# Patient Record
Sex: FEMALE | Race: WHITE | NOT HISPANIC OR LATINO | Employment: OTHER | URBAN - METROPOLITAN AREA
[De-identification: names, ages, dates, MRNs, and addresses within clinical notes are randomized per-mention and may not be internally consistent; named-entity substitution may affect disease eponyms.]

---

## 2018-11-02 ENCOUNTER — OCCUPATIONAL MEDICINE (OUTPATIENT)
Dept: URGENT CARE | Facility: CLINIC | Age: 53
End: 2018-11-02
Payer: COMMERCIAL

## 2018-11-02 ENCOUNTER — APPOINTMENT (OUTPATIENT)
Dept: RADIOLOGY | Facility: IMAGING CENTER | Age: 53
End: 2018-11-02
Attending: NURSE PRACTITIONER
Payer: COMMERCIAL

## 2018-11-02 VITALS
HEART RATE: 92 BPM | OXYGEN SATURATION: 94 % | SYSTOLIC BLOOD PRESSURE: 118 MMHG | RESPIRATION RATE: 16 BRPM | TEMPERATURE: 98.6 F | DIASTOLIC BLOOD PRESSURE: 74 MMHG | BODY MASS INDEX: 30.64 KG/M2 | WEIGHT: 152 LBS | HEIGHT: 59 IN

## 2018-11-02 DIAGNOSIS — S89.92XA INJURY OF LEFT KNEE, INITIAL ENCOUNTER: ICD-10-CM
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
## 2018-11-02 DIAGNOSIS — S86.912A KNEE STRAIN, LEFT, INITIAL ENCOUNTER: ICD-10-CM

## 2018-11-02 DIAGNOSIS — S46.911A STRAIN OF RIGHT SHOULDER, INITIAL ENCOUNTER: ICD-10-CM

## 2018-11-02 DIAGNOSIS — M62.838 TRAPEZIUS MUSCLE SPASM: ICD-10-CM

## 2018-11-02 PROCEDURE — 73562 X-RAY EXAM OF KNEE 3: CPT | Mod: 26,LT,29 | Performed by: NURSE PRACTITIONER

## 2018-11-02 PROCEDURE — 99204 OFFICE O/P NEW MOD 45 MIN: CPT | Mod: 29 | Performed by: NURSE PRACTITIONER

## 2018-11-02 RX ORDER — CYCLOBENZAPRINE HCL 10 MG
10 TABLET ORAL 3 TIMES DAILY PRN
Qty: 30 TAB | Refills: 0 | Status: SHIPPED | OUTPATIENT
Start: 2018-11-02 | End: 2019-10-21

## 2018-11-02 RX ORDER — TRAZODONE HYDROCHLORIDE 50 MG/1
50 TABLET ORAL NIGHTLY
COMMUNITY
End: 2021-11-12

## 2018-11-02 ASSESSMENT — ENCOUNTER SYMPTOMS
NECK PAIN: 1
FALLS: 1

## 2018-11-02 NOTE — PROGRESS NOTES
"Subjective:      Bernarda Raymond is a 53 y.o. female who presents with Knee Injury (patient fell down a stair well and twisted left knee, hurts to walk, bend and move it) and Neck Pain (raphaeletn states her neck and shoulder hurt due to fall )      DOI: 11/2/18- Pt reports the elevators at work today were broken.  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 Pt reports she slipped and fell down approximately 4 stairs, between the 3rd and 2nd floor, and landed on the landing of 2nd floor. She reports to stop her fall she grabbed onto the railing with her right arm as she slid down the stairs. Reports her left leg was bent underneath her as she fell. She is currently reporting right upper arm/shoulder pain and left knee pain. She has not taken any medication for this injury. Previous left knee in [place] in 2000. She denies a second job.      HPI    Review of Systems   Musculoskeletal: Positive for falls, joint pain (left knee) and neck pain (right shoulder).   All other systems reviewed and are negative.    History reviewed. No pertinent past medical history. History reviewed. No pertinent surgical history.   Social History     Social History   • Marital status:      Spouse name: N/A   • Number of children: N/A   • Years of education: N/A     Occupational History   • Not on file.     Social History Main Topics   • Smoking status: Never Smoker   • Smokeless tobacco: Never Used   • Alcohol use No   • Drug use: No   • Sexual activity: No     Other Topics Concern   • Not on file     Social History Narrative   • No narrative on file          Objective:     /74   Pulse 92   Temp 37 °C (98.6 °F)   Resp 16   Ht 1.499 m (4' 11\")   Wt 68.9 kg (152 lb)   SpO2 94%   BMI 30.70 kg/m²      Physical Exam   Constitutional: She is oriented to person, place, and time. Vital signs are normal. She appears well-developed and well-nourished.   HENT:   Head: " Normocephalic and atraumatic.   Eyes: Pupils are equal, round, and reactive to light. EOM are normal.   Neck: Normal range of motion.   Cardiovascular: Normal rate and regular rhythm.    Pulmonary/Chest: Effort normal.   Musculoskeletal:        Left knee: She exhibits decreased range of motion and swelling. She exhibits no effusion, no ecchymosis, no deformity, no LCL laxity, normal patellar mobility, no bony tenderness and no MCL laxity. Tenderness found. Medial joint line and lateral joint line tenderness noted.        Cervical back: She exhibits tenderness, pain and spasm.        Back:    Neurological: She is alert and oriented to person, place, and time.   Skin: Skin is warm and dry. Capillary refill takes less than 2 seconds.   Psychiatric: She has a normal mood and affect. Her speech is normal and behavior is normal. Thought content normal.   Vitals reviewed.      Left knee- generally tender to left knee. Exquisitely tender to medial and lateral joint lines. Moderate STS. No effusion present. No crepitus noted with palp. Limited ROM secondary to pain. No laxity noted to knee. Antalgic gait.     Xray: no fracture or dislocation by my read. Radiology review pending.  11/2/2018 11:54 AM    HISTORY/REASON FOR EXAM:  Pain/Deformity Following Trauma.      TECHNIQUE/EXAM DESCRIPTION AND NUMBER OF VIEWS:  3 views of the LEFT knee.    COMPARISON: None    FINDINGS:  Deformities the proximal left tibia and fibula appear to be old.  No acute fracture identified.  No definite knee joint effusion.  Tiny marginal spurs.      Impression       Deformities of the proximal left tibia and fibula appear to be old. No definite acute fracture.       Assessment/Plan:     1. Fall, initial encounter    2. Injury of left knee, initial encounter  - DX-KNEE 3 VIEWS LEFT; Future    3. Knee strain, left, initial encounter    4. Trapezius muscle spasm  - cyclobenzaprine (FLEXERIL) 10 MG Tab; Take 1 Tab by mouth 3 times a day as needed.   Dispense: 30 Tab; Refill: 0    5. Strain of right shoulder, initial encounter    Hinged knee brace  Ice and elevate regularly for the next 48 hours  OTC Ibuprofen as needed for pain  Sedating effects of flexeril discussed  FV in 4 days

## 2018-11-02 NOTE — LETTER
[address] [place] [address]  Phone:  [phone] - Fax:  [phone]   [facility] Progress Report and Disability Certification  Date of Service: 11/2/2018   No Show:  No  Date / Time of Next Visit: 11/6/2018 @ 9:15am   Claim Information   Patient Name: Bernarda Raymond  Claim Number:     Employer:    [employer]  Date of Injury: 11/2/2018     Insurer / TPA: [employer]  ID / SSN:     Occupation:   Diagnosis: Diagnoses of Fall, initial encounter, Injury of left knee, initial encounter, Knee strain, left, initial encounter, Trapezius muscle spasm, and Strain of right shoulder, initial encounter were pertinent to this visit.    Medical Information   Related to Industrial Injury? Yes    Subjective Complaints:  DOI: 11/2/18- Pt reports the elevators at work today were broken.  escorted pt and other employees down stairwell in order to access [employer] elevators. Pt reports she slipped and fell down approximately 4 stairs, between the 3rd and 2nd floor, and landed on the landing of 2nd floor. She reports to stop her fall she grabbed onto the railing with her right arm as she slid down the stairs. Reports her left leg was bent underneath her as she fell. She is currently reporting right upper arm/shoulder pain and left knee pain. She has not taken any medication for this injury. Previous left knee in [employer] in 2000. She denies a second job.    Objective Findings: Left knee- generally tender to left knee. Exquisitely tender to medial and lateral joint lines. Moderate STS. No effusion present. No crepitus noted with palp. Limited ROM secondary to pain. No laxity noted to knee. Antalgic gait.   Pre-Existing Condition(s): Per pt left ACL tear in 2000. Never had MRI performed. Apparently healed it with PT. No further issues since approx. 2002 with left knee   Assessment:   Initial  Visit    Status: Additional Care Required  Permanent Disability:No    Plan:      Diagnostics: X-ray  Comments:negative views of left knee    Comments:  Hinged knee brace  Ice and elevate regularly for the next 48 hours  OTC Ibuprofen as needed for pain  Sedating effects of flexeril discussed  FV in 4 days    Disability Information   Status: Released to Restricted Duty    From:  11/2/2018  Through: 11/6/2018 Restrictions are: Temporary   Physical Restrictions   Sitting:    Standing:  < or = to 1 hr/day Stooping:    Bending:      Squatting:    Walking:    Comments:for a total of 30 minutes over the course of the work day Climbing:    Pushing:      Pulling:    Other:    Reaching Above Shoulder (L):   Reaching Above Shoulder (R):       Reaching Below Shoulder (L):    Reaching Below Shoulder (R):      Not to exceed Weight Limits   Carrying(hrs):   Weight Limit(lb):   Lifting(hrs):   Weight  Limit(lb):     Comments:      Repetitive Actions   Hands: i.e. Fine Manipulations from Grasping:     Feet: i.e. Operating Foot Controls:     Driving / Operate Machinery:     Physician Name: MATTHEW Stover Physician Signature: ALEX Bowling e-Signature: Dr. Mao Christiansen, Medical Director   Clinic Name / Location: [address] [address] Clinic Phone Number: Dept: [phone]   Appointment Time: 10:30 Am Visit Start Time: 11:15 AM   Check-In Time:  11:00 Am Visit Discharge Time:  1:05pm   Original-Treating Physician or Chiropractor    Page 2-Insurer/TPA    Page 3-Employer    Page 4-Employee

## 2018-11-02 NOTE — LETTER
"EMPLOYEE’S CLAIM FOR COMPENSATION/ REPORT OF INITIAL TREATMENT  FORM C-4    EMPLOYEE’S CLAIM - PROVIDE ALL INFORMATION REQUESTED   First Name  Bernarda Rothman Last Name  Segundo Birthdate                    1965                Sex  female Claim Number   Home Address  [address] Age  53 y.o. Height  1.499 m (4' 11\") Weight  68.9 kg (152 lb) [facility]     [place] Zip  [address] Telephone  [phone] (home)    Mailing Address  [address] [place] Zip  [address] Primary Language Spoken  English    Insurer  unknown Third Party   [employer] [facility]   Employee's Occupation (Job Title) When Injury or Occupational Disease Occurred      Employer's Name     [employer]  Telephone      Employer Address  [address]  [place]  [address]    Date of Injury  11/2/2018               Hour of Injury  7:55 AM Date Employer Notified  11/2/2018 Last Day of Work after Injury or Occupational Disease  11/2/2018 Supervisor to Whom Injury Reported  Joselin Shine   [address] or [address] of Acc[address] (if applicable)  [address]   What were you doing at the time of accident? (if applicable)  Walking down stairs    How did this injury or occupational disease occur? (Be specific an answer in detail. Use additional sheet if necessary)  Elevators were not working. Maintenance escorted us(3ppl) downstaris from 7th [place] parking garage to 2nd floor, fell between 3rd & 2nd (?) Floor on stairs-then walked carefully with pain across 2nd floor to fright elev. from there, went to 15th floor to office.   If you believe that you have an occupational disease, when did you first have knowledge of the disability and it relationship to your employment?  n/a Witnesses to the Accident  [facility]  [phone]      "   Nature of Injury or Occupational Disease  Crushing  Part(s) of Body Injured or Affected  Knee (L), Defer, Defer    I certify that the above is true and correct to the best of my knowledge and that I have provided this information in order to obtain the benefits of [place] or [place]).  I hereby authorize any physician, chiropractor, surgeon, practitioner, or other person, any hospital, including [facility] or [facility], any medical service organization, any insurance company, or other institution or organization to release to each other, any medical or other information, including benefits paid or payable, pertinent to this injury or disease, except information relative to diagnosis, treatment and/or counseling for AIDS, psychological conditions, alcohol or controlled substances, for which I must give specific authorization.  A Photostat of this authorization shall be as valid as the original.     Date   Place   Employee’s Signature   THIS REPORT MUST BE COMPLETED AND MAILED WITHIN 3 WORKING DAYS OF TREATMENT   Place  [facility]  Name of [facility]   Date  11/2/2018 Diagnosis  (W19.XXXA) Fall, initial encounter  (S89.92XA) Injury of left knee, initial encounter  (S86.912A) Knee strain, left, initial encounter  (M62.838) Trapezius muscle spasm  (S46.911A) Strain of right shoulder, initial encounter Is there evidence the injured employee was under the influence of alcohol and/or another controlled substance at the time of accident?   Hour  11:15 AM Description of Injury or Disease  Diagnoses of Fall, initial encounter, Injury of left knee, initial encounter, Knee strain, left, initial encounter, Trapezius muscle spasm, and Strain of right shoulder, initial encounter were pertinent to this visit. No   Treatment  Hinged knee brace  Ice and elevate regularly for the next 48 hours  OTC  Ibuprofen as needed for pain  Sedating effects of flexeril discussed  FV in 4 days  Have you advised the patient to remain off work five days or more? No   X-Ray Findings  Negative  Comments:Negative views of left knee   If Yes   From Date  To Date      From information given by the employee, together with medical evidence, can you directly connect this injury or occupational disease as job incurred?  Yes If No Full Duty  No Modified Duty  Yes   Is additional medical care by a physician indicated?  Yes If Modified Duty, Specify any Limitations / Restrictions      Do you know of any previous injury or disease contributing to this condition or occupational disease?                            Yes  Comments:reports she tore her left ACL in 2000. Never had MRI performed. Apparently healed it with PT. No further issues since approx. 2002 with left knee   Date  11/2/2018 Print Doctor’s Name MATTHEW Stover I certify the employer’s copy of  this form was mailed on:   Address  [address] [place] [address] Insurer’s Use Only     [facility]  [address]    Provider’s Tax ID Number  [id number] Telephone  Dept: [phone]        e-SignWHALEX CHAMPION   e-Signature: Dr. Mao Christiansen, Medical Director Degree  APRN        ORIGINAL-TREATING PHYSICIAN OR CHIROPRACTOR    PAGE 2-INSURER/TPA    PAGE 3-EMPLOYER    PAGE 4-EMPLOYEE             Form C-4 (rev10/07)              BRIEF DESCRIPTION OF RIGHTS AND BENEFITS  (Pursuant to [place] 616C.050)    Notice of Injury or Occupational Disease (Incident Report Form C-1): If an injury or occupational disease (OD) arises out of and in the  course of employment, you must provide written notice to your employer as soon as practicable, but no later than 7 days after the accident or  OD. Your employer shall maintain a sufficient supply of the required forms.    Claim for Compensation (Form C-4): If medical treatment is sought, the form C-4 is available at the place of  "initial treatment. A completed  \"Claim for Compensation\" (Form C-4) must be filed within 90 days after an accident or OD. The treating physician or chiropractor must,  within 3 working days after treatment, complete and mail to the employer, the employer's insurer and third-party , the Claim for  Compensation.    Medical Treatment: If you require medical treatment for your on-the-job injury or OD, you may be required to select a physician or  chiropractor from a list provided by your workers’ compensation insurer, if it has contracted with an Organization for Managed Care (MCO) or  Preferred Provider Organization (PPO) or providers of health care. If your employer has not entered into a contract with an MCO or PPO, you  may select a physician or chiropractor from the Panel of Physicians and Chiropractors. Any medical costs related to your industrial injury or  OD will be paid by your insurer.    Temporary Total Disability (TTD): If your doctor has certified that you are unable to work for a period of at least 5 consecutive days, or 5  cumulative days in a 20-day period, or places restrictions on you that your employer does not accommodate, you may be entitled to TTD  compensation.    Temporary Partial Disability (TPD): If the wage you receive upon reemployment is less than the compensation for TTD to which you are  entitled, the insurer may be required to pay you TPD compensation to make up the difference. TPD can only be paid for a maximum of 24  months.    Permanent Partial Disability (PPD): When your medical condition is stable and there is an indication of a PPD as a result of your injury or  OD, within 30 days, your insurer must arrange for an evaluation by a rating physician or chiropractor to determine the degree of your PPD. The  amount of your PPD award depends on the date of injury, the results of the PPD evaluation and your age and wage.    Permanent Total Disability (PTD): If you are " medically certified by a treating physician or chiropractor as permanently and totally disabled  and have been granted a PTD status by your insurer, you are entitled to receive monthly benefits not to exceed 66 2/3% of your average  monthly wage. The amount of your PTD payments is subject to reduction if you previously received a PPD award.    Vocational Rehabilitation Services: You may be eligible for vocational rehabilitation services if you are unable to return to the job due to a  permanent physical impairment or permanent restrictions as a result of your injury or occupational disease.    Transportation and Per Juan Carlos Reimbursement: You may be eligible for travel expenses and per juan carlos associated with medical treatment.    Reopening: You may be able to reopen your claim if your condition worsens after claim closure.    Appeal Process: If you disagree with a written determination issued by the insurer or the insurer does not respond to your request, you may  appeal to the Department of Administration, , by following the instructions contained in your determination letter. You must  appeal the determination within 70 days from the date of the determination letter at [address], [address], [place]  [address], or [address], [address], [place] [address]. If you disagree with the  decision, you may appeal to the  Department of Administration, . You must file your appeal within 30 days from the date of the  decision  letter at [address] [place], [address], [place], [place] [address], or [address], [address], [place] [address]. If you  disagree with a decision of an , you may file a petition for judicial review with the District Court. You must do so within 30  days of the Appeal Officer’s decision. You may be represented by an  at your own expense or you may contact the [facility] for  possible  representation.    [place]  for Injured Workers (NAIW): If you disagree with a  decision, you may request that NAIW represent you  without charge at an  Hearing. For information regarding denial of benefits, you may contact the NAIW at: [address], [address], [place] [address], [phone], or [address], [place] [address], [phone]    To File a Complaint with the Division: If you wish to file a complaint with the  of the Division of Industrial Relations (DIR),  please contact the Workers’ Compensation Section, [address], [address], [place], [place] [address], telephone (025) [phone], or  [address], [address], [place], [place] [address], telephone [phone]) [phone].    For assistance with Workers’ Compensation Issues: you may contact the Office of the Governor Consumer Health Assistance, [address], [address], [place], [place] [address], Toll Free 1-372.236.6171, Web site: http://govcha.[facility].nv.us, E-mail  Ene@[facility].nv.                                                                                                                                                                                                                                   __________________________________________________________________                                                                   _________________                Employee Name / Signature                                                                                                                                                       Date                                                                                                                                                                                                     D-2 (rev. 10/07)

## 2018-11-02 NOTE — LETTER
November 2, 2018         Patient: Bernarda Raymond   YOB: 1965   Date of Visit: 11/2/2018           To Whom it May Concern:    Bernarda Raymond was seen in my clinic on 11/2/2018. Please excuse her from work today.        Sincerely,           ELMER Stover.  Electronically Signed

## 2018-11-06 ENCOUNTER — OCCUPATIONAL MEDICINE (OUTPATIENT)
Dept: OCCUPATIONAL MEDICINE | Facility: CLINIC | Age: 53
End: 2018-11-06
Payer: COMMERCIAL

## 2018-11-06 VITALS
BODY MASS INDEX: 31.41 KG/M2 | SYSTOLIC BLOOD PRESSURE: 130 MMHG | DIASTOLIC BLOOD PRESSURE: 86 MMHG | WEIGHT: 160 LBS | OXYGEN SATURATION: 92 % | HEIGHT: 60 IN | RESPIRATION RATE: 16 BRPM | HEART RATE: 96 BPM | TEMPERATURE: 99 F

## 2018-11-06 DIAGNOSIS — S83.92XD SPRAIN OF LEFT KNEE, UNSPECIFIED LIGAMENT, SUBSEQUENT ENCOUNTER: ICD-10-CM

## 2018-11-06 PROCEDURE — 99203 OFFICE O/P NEW LOW 30 MIN: CPT | Performed by: PREVENTIVE MEDICINE

## 2018-11-06 ASSESSMENT — PAIN SCALES - GENERAL: PAINLEVEL: 6=MODERATE PAIN

## 2018-11-06 NOTE — LETTER
[address],   [address] [place] [address]  Phone:  [phone] - Fax:  [phone]   Occupational Health [facility] Progress Report and Disability Certification  Date of Service: 11/6/2018   No Show:  No  Date / Time of Next Visit: 11/16/2018 @ 9AM   Claim Information   Patient Name: Bernarda Raymond  Claim Number:     Employer:   [employer]  Date of Injury: 11/2/2018     Insurer / TPA: [employer]  ID / SSN:     Occupation:   Diagnosis: The encounter diagnosis was Sprain of left knee, unspecified ligament, subsequent encounter.    Medical Information   Related to Industrial Injury? Yes    Subjective Complaints:  Date of injury 11/2/2018.  Mechanism of injury-fall on stairs.  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  She was seen in urgent care 4 days ago where x-rays were negative.  She does report some interval improvement with less throbbing.  She had some swelling which is improved.  She is using OTC Advil with some relief.   Objective Findings: Appearance: Well-developed, well-nourished.   Mental Status: Mood and Affect normal. Pleasant. Cooperative. Appropriate.   ENT: Oropharynx clear. Moist mucous membranes. Hearing normal.   Eyes: Pupils reactive. Conjunctiva normal. No scleral icterus.   Neck: Trachea Midline. No thyromegaly. No masses.  Cardiovascular: Normal rate. Regular rhythm. Normal heart sounds.   Chest: Effort normal. Breath sounds clear.   Skin: Skin is warm and dry. No rash.   Musculoskeletal: Left knee shows no ecchymosis.  Minimal swelling.  Tenderness medially.  Pain with valgus stress testing.  Good flexion extension.  Distal neurovascular intact.     Pre-Existing Condition(s):     Assessment:   Condition Improved    Status: Additional Care Required  Permanent Disability:No    Plan:      Diagnostics:      Comments:       Disability Information   Status:  Released to Restricted Duty    From:  11/6/2018  Through: 11/16/2018 Restrictions are: Temporary   Physical Restrictions   Sitting:    Standing:  < or = to 4 hrs/day Stooping:    Bending:      Squatting:    Walking:  < or = to 2 hrs/day Climbing:    Pushing:      Pulling:    Other:    Reaching Above Shoulder (L):   Reaching Above Shoulder (R):       Reaching Below Shoulder (L):    Reaching Below Shoulder (R):      Not to exceed Weight Limits   Carrying(hrs):   Weight Limit(lb):   Lifting(hrs):   Weight  Limit(lb):     Comments:      Repetitive Actions   Hands: i.e. Fine Manipulations from Grasping:     Feet: i.e. Operating Foot Controls:     Driving / Operate Machinery:     Physician Name: Darin Mahoney M.D. Physician Signature: DARIN Charles M.D. e-Signature: Dr. Mao Christiansen, Medical Director   Clinic Name / Location: [address],   [address] [place] [address] Clinic Phone Number: Dept: [phone]   Appointment Time: 9:15 Am Visit Start Time: 9:13 AM   Check-In Time:  9:01 Am Visit Discharge Time:  9:41 AM   Original-Treating Physician or Chiropractor    Page 2-Insurer/TPA    Page 3-Employer    Page 4-Employee

## 2018-11-06 NOTE — PROGRESS NOTES
Subjective:      Bernarda Raymond is a 53 y.o. female who presents with Follow-Up (WC DOI (11/2/18)-Left knee- Feeling same- room 16 )      Date of injury 11/2/2018.  Mechanism of injury-fall on stairs.  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  She was seen in urgent care 4 days ago where x-rays were negative.  She does report some interval improvement with less throbbing.  She had some swelling which is improved.  She is using OTC Advil with some relief.     HPI    ROS  Comprehensive medical history form reviewed. Pertinent positives and negatives included in HPI.    PFSH: reviewed in Epic    PMH:  has no past medical history on file.  MEDS:   Current Outpatient Prescriptions:   •  traZODone (DESYREL) 50 MG Tab, Take 50 mg by mouth every evening., Disp: , Rfl:   •  cyclobenzaprine (FLEXERIL) 10 MG Tab, Take 1 Tab by mouth 3 times a day as needed., Disp: 30 Tab, Rfl: 0  ALLERGIES: No Known Allergies  SURGHX: History reviewed. No pertinent surgical history.  SOCHX:  reports that she has never smoked. She has never used smokeless tobacco. She reports that she does not drink alcohol or use drugs.  Work Status: Works as   FH: No pertinent hereditary disorders.        Objective:     /86 (BP Location: Right arm, Patient Position: Sitting, BP Cuff Size: Adult)   Pulse 96   Temp 37.2 °C (99 °F) (Temporal)   Resp 16   Ht 1.524 m (5')   Wt 72.6 kg (160 lb)   SpO2 92%   BMI 31.25 kg/m²      Physical Exam    Appearance: Well-developed, well-nourished.   Mental Status: Mood and Affect normal. Pleasant. Cooperative. Appropriate.   ENT: Oropharynx clear. Moist mucous membranes. Hearing normal.   Eyes: Pupils reactive. Conjunctiva normal. No scleral icterus.   Neck: Trachea Midline. No thyromegaly. No masses.  Cardiovascular: Normal rate. Regular rhythm. Normal heart sounds.   Chest: Effort normal. Breath sounds clear.   Skin: Skin is warm and dry. No rash.    Musculoskeletal: Left knee shows no ecchymosis.  Minimal swelling.  Tenderness medially.  Pain with valgus stress testing.  Good flexion extension.  Distal neurovascular intact.         Assessment/Plan:     1. Sprain of left knee, unspecified ligament, subsequent encounter  New to occupational health from urgent care  Satisfactory interval improvement  OTC Advil  Good activity  Recheck in 1 week

## 2018-11-16 ENCOUNTER — OCCUPATIONAL MEDICINE (OUTPATIENT)
Dept: OCCUPATIONAL MEDICINE | Facility: CLINIC | Age: 53
End: 2018-11-16
Payer: COMMERCIAL

## 2018-11-16 VITALS
BODY MASS INDEX: 31.41 KG/M2 | DIASTOLIC BLOOD PRESSURE: 80 MMHG | HEIGHT: 60 IN | HEART RATE: 112 BPM | SYSTOLIC BLOOD PRESSURE: 124 MMHG | WEIGHT: 160 LBS | RESPIRATION RATE: 16 BRPM | TEMPERATURE: 97.6 F | OXYGEN SATURATION: 91 %

## 2018-11-16 DIAGNOSIS — S83.92XD SPRAIN OF LEFT KNEE, UNSPECIFIED LIGAMENT, SUBSEQUENT ENCOUNTER: ICD-10-CM

## 2018-11-16 PROCEDURE — 99214 OFFICE O/P EST MOD 30 MIN: CPT | Performed by: PREVENTIVE MEDICINE

## 2018-11-16 ASSESSMENT — PAIN SCALES - GENERAL: PAINLEVEL: 6=MODERATE PAIN

## 2018-11-16 ASSESSMENT — ENCOUNTER SYMPTOMS
CONSTITUTIONAL NEGATIVE: 1
NEUROLOGICAL NEGATIVE: 1

## 2018-11-16 NOTE — LETTER
[address],   [address] [place] [address]  Phone:  [phone] - Fax:  [phone]   [facility] Progress Report and Disability Certification  Date of Service: 11/16/2018   No Show:  No  Date / Time of Next Visit: 12/20/2018 @ 9:35 AM   Claim Information   Patient Name: Bernarda Raymond  Claim Number:     Employer:   [employer] [address] [employer] Date of Injury: 11/2/2018     Insurer / TPA: [employer]  ID / SSN:     Occupation:   Diagnosis: The encounter diagnosis was Sprain of left knee, unspecified ligament, subsequent encounter.    Medical Information   Related to Industrial Injury? Yes    Subjective Complaints:  Date of injury 11/2/2018.  Mechanism of injury-fall on stairs.  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  She was seen in urgent care where x-rays were negative.  Although she relates some interval improvement, still continues to have moderate activity related pain on the medial aspect of the joint.  Today, she reports that she suffered an ACL tear 18 years ago which improved with conservative management.   Objective Findings: Appearance: Well-developed, well-nourished.   Mental Status: Mood and Affect normal. Pleasant. Cooperative. Appropriate.   Musculoskeletal: Left knee shows mild swelling and tenderness on the medial aspect of the joint especially over the joint line.  Negative drawer's.     Pre-Existing Condition(s):     Assessment:   Condition Improved    Status: Additional Care Required  Permanent Disability:No    Plan: Diagnostics    Diagnostics: MRI    Comments:       Disability Information   Status: Released to Restricted Duty    From:  11/16/2018  Through: 12/20/2018 Restrictions are: Temporary   Physical Restrictions   Sitting:    Standing:  < or = to 4 hrs/day Stooping:    Bending:      Squatting:    Walking:  < or = to 2 hrs/day Climbing:   Pushing:      Pulling:    Other:    Reaching Above Shoulder (L):   Reaching Above Shoulder (R):       Reaching Below Shoulder (L):    Reaching Below Shoulder (R):      Not to exceed Weight Limits   Carrying(hrs):   Weight Limit(lb):   Lifting(hrs):   Weight  Limit(lb):     Comments:      Repetitive Actions   Hands: i.e. Fine Manipulations from Grasping:     Feet: i.e. Operating Foot Controls:     Driving / Operate Machinery:     Physician Name: Darin Mahoney M.D. Physician Signature: DARIN Charles M.D. e-Signature: Dr. Mao Christiansen, Medical Director   Clinic Name / Location: [address],   [address] [place] [address], [place] [address] Clinic Phone Number: Dept: [phone]   Appointment Time: 9:00 Am Visit Start Time: 8:57 AM   Check-In Time:  8:55 Am Visit Discharge Time:  9:30 AM   Original-Treating Physician or Chiropractor    Page 2-Insurer/TPA    Page 3-Employer    Page 4-Employee

## 2018-11-16 NOTE — PROGRESS NOTES
Subjective:      Bernarda Raymond is a 53 y.o. female who presents with Follow-Up (WC DOI (11/2/18)-Left knee- better room 16)      Date of injury 11/2/2018.  Mechanism of injury-fall on stairs.  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  She was seen in urgent care where x-rays were negative.  Although she relates some interval improvement, still continues to have moderate activity related pain on the medial aspect of the joint.  Today, she reports that she suffered an ACL tear 18 years ago which improved with conservative management.     HPI    Review of Systems   Constitutional: Negative.    Neurological: Negative.      PFSH:  WORK STATUS: Restricted activity  PMH:  has no past medical history on file.  MEDS:   Current Outpatient Prescriptions:   •  traZODone (DESYREL) 50 MG Tab, Take 50 mg by mouth every evening., Disp: , Rfl:   •  cyclobenzaprine (FLEXERIL) 10 MG Tab, Take 1 Tab by mouth 3 times a day as needed., Disp: 30 Tab, Rfl: 0       Objective:     /80 (BP Location: Right arm, Patient Position: Sitting, BP Cuff Size: Adult long)   Pulse (!) 112   Temp 36.4 °C (97.6 °F) (Temporal)   Resp 16   Ht 1.524 m (5')   Wt 72.6 kg (160 lb)   SpO2 91%   BMI 31.25 kg/m²      Physical Exam    Appearance: Well-developed, well-nourished.   Mental Status: Mood and Affect normal. Pleasant. Cooperative. Appropriate.   Musculoskeletal: Left knee shows mild swelling and tenderness on the medial aspect of the joint especially over the joint line.  Negative drawer's.         Assessment/Plan:     1. Sprain of left knee, unspecified ligament, subsequent encounter  2.  Probable meniscal disease/tear   Condition with minimal improvement  - MR-KNEE-W/O LEFT; Future  - REFERRAL TO RADIOLOGY  Restricted activity  Recheck in 4 weeks or sooner if MRI accomplished

## 2018-11-19 ENCOUNTER — TELEPHONE (OUTPATIENT)
Dept: OCCUPATIONAL MEDICINE | Facility: CLINIC | Age: 53
End: 2018-11-19

## 2018-11-20 NOTE — TELEPHONE ENCOUNTER
1. Caller Name: Dr. Mahoney                                         Call Back Number:       Patient approves a detailed voicemail message: N\A    I spoke with Dr. Mahoney about this pt if she still needs the knee splint brace because the one that she was fitted was big. Dr. Velasquez said he will get back to me.  I spoke with Dr. Bolton since pt is scheduled with Dr. Bolton for F/v on 12/20/18 he stated to just have the pt come in to get fitted for a new knee brace. Pt doesn't have to see the providers since it is just for getting fitted for a knee brace. Pt is also aware of this.

## 2018-12-12 ENCOUNTER — OCCUPATIONAL MEDICINE (OUTPATIENT)
Dept: OCCUPATIONAL MEDICINE | Facility: CLINIC | Age: 53
End: 2018-12-12
Payer: COMMERCIAL

## 2018-12-12 VITALS
TEMPERATURE: 99.5 F | RESPIRATION RATE: 12 BRPM | OXYGEN SATURATION: 91 % | DIASTOLIC BLOOD PRESSURE: 68 MMHG | SYSTOLIC BLOOD PRESSURE: 114 MMHG | WEIGHT: 157 LBS | BODY MASS INDEX: 30.82 KG/M2 | HEART RATE: 92 BPM | HEIGHT: 60 IN

## 2018-12-12 DIAGNOSIS — S83.92XD SPRAIN OF LEFT KNEE, UNSPECIFIED LIGAMENT, SUBSEQUENT ENCOUNTER: ICD-10-CM

## 2018-12-12 PROCEDURE — 99213 OFFICE O/P EST LOW 20 MIN: CPT | Performed by: PREVENTIVE MEDICINE

## 2018-12-12 ASSESSMENT — ENCOUNTER SYMPTOMS
SENSORY CHANGE: 0
TINGLING: 0

## 2018-12-12 NOTE — LETTER
[address],   [address] [place] [address]  Phone:  [phone] - Fax:  [phone]   [facility] Progress Report and Disability Certification  Date of Service: 12/12/2018   No Show:  No  Date / Time of Next Visit: 1/8/2019 @ 8:15 AM   Claim Information   Patient Name: Bernarda Raymond  Claim Number:     Employer:   [employer] [address] [employer] [address] [employer] Date of Injury: 11/2/2018     Insurer / TPA: [employer]  ID / SSN:     Occupation:   Diagnosis: The encounter diagnosis was Sprain of left knee, unspecified ligament, subsequent encounter.    Medical Information   Related to Industrial Injury? Yes    Subjective Complaints:  Date of injury 11/2/2018.  Mechanism of injury-fall on stairs.  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  She was seen in urgent care where x-rays were negative. Patient states that the pain has improved somewhat with walking and pain in the medial knee.  Patient had MRI performed.  Not currently taking any medications for this condition.   Objective Findings: Left knee: No gross deformity.  Medial joint line tenderness.  Negative drawer's.  Slight antalgic gait.    MRI left knee: Grade 2 MCL sprain.     Pre-Existing Condition(s):     Assessment:   Condition Same    Status: Additional Care Required  Permanent Disability:No    Plan:      Diagnostics:      Comments:  Referral to physical therapy  Prescribed diclofenac gel to use 3 times daily as needed  Restricted duty  Follow-up 3 weeks    Disability Information   Status: Released to Restricted Duty    From:  12/12/2018  Through: 1/8/2019 Restrictions are: Temporary   Physical Restrictions   Sitting:    Standing:  < or = to 4 hrs/day Stooping:    Bending:      Squatting:    Walking:  < or = to 2 hrs/day Climbing:    Pushing:      Pulling:    Other:    Reaching Above Shoulder (L):   Reaching Above Shoulder (R):      Reaching Below Shoulder (L):    Reaching Below Shoulder (R):      Not to exceed Weight Limits   Carrying(hrs):   Weight Limit(lb):   Lifting(hrs):   Weight  Limit(lb):     Comments:      Repetitive Actions   Hands: i.e. Fine Manipulations from Grasping:     Feet: i.e. Operating Foot Controls:     Driving / Operate Machinery:     Physician Name: Denton Bolton D.O. Physician Signature: DENTON Yip D.O. e-Signature: Dr. Mao Christiansen, Medical Director   Clinic Name / Location: [address],   [address]  [place] [address] Clinic Phone Number: Dept: [phone]   Appointment Time: 11:45 Am Visit Start Time: 11:20 AM   Check-In Time:  11:16 Am Visit Discharge Time:  11:45 AM   Original-Treating Physician or Chiropractor    Page 2-Insurer/TPA    Page 3-Employer    Page 4-Employee

## 2018-12-12 NOTE — PROGRESS NOTES
Subjective:      Bernarda Raymond is a 53 y.o. female who presents with Follow-Up ( DOI 11/2/18 - Left knee - same -)      Date of injury 11/2/2018.  Mechanism of injury-fall on stairs.  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  She was seen in urgent care where x-rays were negative. Patient states that the pain has improved somewhat with walking and pain in the medial knee.  Patient had MRI performed.  Not currently taking any medications for this condition.     HPI    Review of Systems   Neurological: Negative for tingling and sensory change.     SOCHX: Works as a an   FH: No pertinent family history to this problem.     Objective:     /68 (BP Location: Right arm, Patient Position: Sitting)   Pulse 92   Temp 37.5 °C (99.5 °F) (Temporal)   Resp 12   Ht 1.524 m (5')   Wt 71.2 kg (157 lb)   SpO2 91%   BMI 30.66 kg/m²      Physical Exam   Constitutional: She is oriented to person, place, and time. She appears well-developed and well-nourished.   Cardiovascular: Normal rate.    Pulmonary/Chest: Effort normal.   Neurological: She is alert and oriented to person, place, and time.   Skin: Skin is warm and dry.   Psychiatric: She has a normal mood and affect. Judgment normal.       Left knee: No gross deformity.  Medial joint line tenderness.  Negative drawer's.  Slight antalgic gait.    MRI left knee: Grade 2 MCL sprain.         Assessment/Plan:     1. Sprain of left knee, unspecified ligament, subsequent encounter  - REFERRAL TO PHYSICAL THERAPY Reason for Therapy: Eval/Treat/Report  - Diclofenac Sodium 1 % Gel; Apply 1 Application to skin as directed 3 times a day as needed.  Dispense: 1 Tube; Refill: 0    Referral to physical therapy  Prescribed diclofenac gel to use 3 times daily as needed  Restricted duty  Follow-up 3 weeks

## 2019-01-16 ENCOUNTER — OCCUPATIONAL MEDICINE (OUTPATIENT)
Dept: OCCUPATIONAL MEDICINE | Facility: CLINIC | Age: 54
End: 2019-01-16
Payer: COMMERCIAL

## 2019-01-16 VITALS
OXYGEN SATURATION: 92 % | BODY MASS INDEX: 31.02 KG/M2 | TEMPERATURE: 97.4 F | SYSTOLIC BLOOD PRESSURE: 118 MMHG | WEIGHT: 158 LBS | DIASTOLIC BLOOD PRESSURE: 72 MMHG | HEART RATE: 104 BPM | HEIGHT: 60 IN

## 2019-01-16 DIAGNOSIS — S83.92XD SPRAIN OF LEFT KNEE, UNSPECIFIED LIGAMENT, SUBSEQUENT ENCOUNTER: ICD-10-CM

## 2019-01-16 PROCEDURE — 99212 OFFICE O/P EST SF 10 MIN: CPT | Performed by: PREVENTIVE MEDICINE

## 2019-01-16 NOTE — LETTER
[address],   [address] [place] [address]  Phone:  [phone] - Fax:  [phone]   [facility] Progress Report and Disability Certification  Date of Service: 1/16/2019   No Show:  No  Date / Time of Next Visit: 2/19/2019 @ 8AM   Claim Information   Patient Name: Bernarda Raymond  Claim Number:     Employer:   [employer] [address] [address] [employer] Date of Injury: 11/2/2018     Insurer / TPA: [employer]  ID / SSN:     Occupation:   Diagnosis: The encounter diagnosis was Sprain of left knee, unspecified ligament, subsequent encounter.    Medical Information   Related to Industrial Injury? Yes    Subjective Complaints:  Date of injury 11/2/2018.  Mechanism of injury-fall on stairs.  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  She was seen in urgent care where x-rays were negative.  MRI left knee showed grade 2 MCL sprain.  Patient notes that she continues good gradual improvement with physical therapy.  Continues with some medial knee pain.  Worsened with squatting, walking.  She relates she has attended about 5 sessions of physical therapy.  Patient states she is essentially doing her normal job   Objective Findings: Left knee: No gross deformity.  Medial joint line tenderness.  Negative drawer's.  Essentially full range of motion.   Pre-Existing Condition(s):     Assessment:   Condition Improved    Status: Additional Care Required  Permanent Disability:No    Plan:      Diagnostics:      Comments:  Referral for more PT visits  Continue OTC medication as needed for pain  Restricted duty  Follow-up 4 weeks      Disability Information   Status: Released to Full Duty    From:  1/16/2019  Through: 2/19/2019 Restrictions are: Temporary   Physical Restrictions   Sitting:    Standing:  < or = to 6 hrs/day Stooping:    Bending:      Squatting:    Walking:  < or = to 6 hrs/day Climbing:   Pushing:      Pulling:    Other:    Reaching Above Shoulder (L):   Reaching Above Shoulder (R):       Reaching Below Shoulder (L):    Reaching Below Shoulder (R):      Not to exceed Weight Limits   Carrying(hrs):   Weight Limit(lb):   Lifting(hrs):   Weight  Limit(lb):     Comments: Activities as tolerated    Repetitive Actions   Hands: i.e. Fine Manipulations from Grasping:     Feet: i.e. Operating Foot Controls:     Driving / Operate Machinery:     Physician Name: Denton Bolton D.O. Physician Signature: DENTON Yip D.O. e-Signature: Dr. Mao Christiansen, Medical Director   Clinic Name / Location: [address],   [address]  [place] [address] Clinic Phone Number: Dept: [phone]   Appointment Time: 3:45 Pm Visit Start Time: 3:27 PM   Check-In Time:  3:23 Pm Visit Discharge Time:  3:53 PM   Original-Treating Physician or Chiropractor    Page 2-Insurer/TPA    Page 3-Employer    Page 4-Employee

## 2019-01-16 NOTE — PROGRESS NOTES
Subjective:      Bernarda Raymond is a 53 y.o. female who presents with Follow-Up (DOi 11/02/18- L knee )      Date of injury 11/2/2018.  Mechanism of injury-fall on stairs.  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  She was seen in urgent care where x-rays were negative.  MRI left knee showed grade 2 MCL sprain.  Patient notes that she continues good gradual improvement with physical therapy.  Continues with some medial knee pain.  Worsened with squatting, walking.  She relates she has attended about 5 sessions of physical therapy.  Patient states she is essentially doing her normal job     HPI    ROS       Objective:     /72   Pulse (!) 104   Temp 36.3 °C (97.4 °F)   Ht 1.524 m (5')   Wt 71.7 kg (158 lb)   SpO2 92%   BMI 30.86 kg/m²      Physical Exam    Left knee: No gross deformity.  Medial joint line tenderness.  Negative drawer's.  Essentially full range of motion.       Assessment/Plan:     1. Sprain of left knee, unspecified ligament, subsequent encounter    Referral for more PT visits   Continue OTC medication as needed for pain   Restricted duty   Follow-up 4 weeks

## 2019-02-19 ENCOUNTER — OCCUPATIONAL MEDICINE (OUTPATIENT)
Dept: OCCUPATIONAL MEDICINE | Facility: CLINIC | Age: 54
End: 2019-02-19
Payer: COMMERCIAL

## 2019-02-19 VITALS
WEIGHT: 156 LBS | OXYGEN SATURATION: 90 % | SYSTOLIC BLOOD PRESSURE: 120 MMHG | HEART RATE: 86 BPM | DIASTOLIC BLOOD PRESSURE: 70 MMHG | BODY MASS INDEX: 30.63 KG/M2 | TEMPERATURE: 97.2 F | HEIGHT: 60 IN

## 2019-02-19 DIAGNOSIS — S83.92XD SPRAIN OF LEFT KNEE, UNSPECIFIED LIGAMENT, SUBSEQUENT ENCOUNTER: ICD-10-CM

## 2019-02-19 PROCEDURE — 99212 OFFICE O/P EST SF 10 MIN: CPT | Performed by: PREVENTIVE MEDICINE

## 2019-02-19 NOTE — LETTER
[address],   [address] [place] [address]  Phone:  [phone] - Fax:  [phone]   [facility] Progress Report and Disability Certification  Date of Service: 2/19/2019   No Show:  No  Date / Time of Next Visit: 3/19/2019 @0915am   Claim Information   Patient Name: Bernarda Raymond  Claim Number:     Employer:   [email or web address] Date of Injury: 11/2/2018     Insurer / TPA: [employer]  ID / SSN:     Occupation:   Diagnosis: The encounter diagnosis was Sprain of left knee, unspecified ligament, subsequent encounter.    Medical Information   Related to Industrial Injury? Yes    Subjective Complaints:  Date of injury 11/2/2018.  Mechanism of injury-fall on stairs.  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  She was seen in urgent care where x-rays were negative.  MRI left knee showed grade 2 MCL sprain. Patient continues to note gradual improvement in symptoms with physical therapy.  She does continue to note some pain in the medial knee.  Pain is worse with being one position for too long or walking on uneven surfaces.  She states that she was fired from her job and currently is working [employer].  She states more physical therapy was approved.  She also notes that she has an appointment Dr. Duque that the insurance set up, she is unsure of purpose at this visit.   Objective Findings: Left knee: No gross deformity.  Minimal medial joint line tenderness.  Negative anterior/posterior drawer's.  Mild pain with varus/valgus stress essentially full range of motion.   Pre-Existing Condition(s):     Assessment:   Condition Improved    Status: Additional Care Required  Permanent Disability:No    Plan:      Diagnostics:      Comments:  Keep appointment Dr. Duque  Continue physical therapy  Full duty with activities as tolerated  Follow-up 4 weeks    Disability Information   Status: Released to Full Duty       From:  2/19/2019  Through: 3/19/2019 Restrictions are:     Physical Restrictions   Sitting:    Standing:    Stooping:    Bending:      Squatting:    Walking:    Climbing:    Pushing:      Pulling:    Other:    Reaching Above Shoulder (L):   Reaching Above Shoulder (R):       Reaching Below Shoulder (L):    Reaching Below Shoulder (R):      Not to exceed Weight Limits   Carrying(hrs):   Weight Limit(lb):   Lifting(hrs):   Weight  Limit(lb):     Comments: Activities as tolerated    Repetitive Actions   Hands: i.e. Fine Manipulations from Grasping:     Feet: i.e. Operating Foot Controls:     Driving / Operate Machinery:     Physician Name: Denton Bolton D.O. Physician Signature: DENTON Yip D.O. e-Signature: Dr. Mao Christiansen, Medical Director   Clinic Name / Location: [address],   [address] [place] [address] Clinic Phone Number: Dept: [phone]   Appointment Time: 8:00 Am Visit Start Time: 8:09 AM   Check-In Time:  8:04 Am Visit Discharge Time:  0831am   Original-Treating Physician or Chiropractor    Page 2-Insurer/TPA    Page 3-Employer    Page 4-Employee

## 2019-02-19 NOTE — PROGRESS NOTES
Subjective:      Bernarda Raymond is a 53 y.o. female who presents with Follow-Up (DOi 11/12/18- L knee- )      Date of injury 11/2/2018.  Mechanism of injury-fall on stairs.  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  She was seen in urgent care where x-rays were negative.  MRI left knee showed grade 2 MCL sprain. Patient continues to note gradual improvement in symptoms with physical therapy.  She does continue to note some pain in the medial knee.  Pain is worse with being one position for too long or walking on uneven surfaces.  She states that she was fired from her job and currently is working [employer].  She states more physical therapy was approved.  She also notes that she has an appointment Dr. Duque that the insurance set up, she is unsure of purpose at this visit.     HPI    ROS       Objective:     /70   Pulse 86   Temp 36.2 °C (97.2 °F)   Ht 1.524 m (5')   Wt 70.8 kg (156 lb)   SpO2 90%   BMI 30.47 kg/m²      Physical Exam    Left knee: No gross deformity.  Minimal medial joint line tenderness.  Negative anterior/posterior drawer's.  Mild pain with varus/valgus stress essentially full range of motion.       Assessment/Plan:     1. Sprain of left knee, unspecified ligament, subsequent encounter  Keep appointment Dr. Duque  Continue physical therapy  Full duty with activities as tolerated  Follow-up 4 weeks

## 2019-03-18 ENCOUNTER — TELEPHONE (OUTPATIENT)
Dept: OCCUPATIONAL MEDICINE | Facility: CLINIC | Age: 54
End: 2019-03-18

## 2019-03-19 ENCOUNTER — OCCUPATIONAL MEDICINE (OUTPATIENT)
Dept: OCCUPATIONAL MEDICINE | Facility: CLINIC | Age: 54
End: 2019-03-19
Payer: COMMERCIAL

## 2019-03-19 VITALS
OXYGEN SATURATION: 92 % | BODY MASS INDEX: 29.25 KG/M2 | HEART RATE: 90 BPM | RESPIRATION RATE: 16 BRPM | DIASTOLIC BLOOD PRESSURE: 78 MMHG | HEIGHT: 60 IN | SYSTOLIC BLOOD PRESSURE: 122 MMHG | WEIGHT: 149 LBS | TEMPERATURE: 97.9 F

## 2019-03-19 DIAGNOSIS — S83.92XD SPRAIN OF LEFT KNEE, UNSPECIFIED LIGAMENT, SUBSEQUENT ENCOUNTER: ICD-10-CM

## 2019-03-19 PROCEDURE — 99212 OFFICE O/P EST SF 10 MIN: CPT | Performed by: PREVENTIVE MEDICINE

## 2019-03-19 NOTE — PROGRESS NOTES
Subjective:      Bernarda Raymond is a 53 y.o. female who presents with Other ([facility] DOI 11/12/18- L knee, better, rm 17)      Date of injury 11/2/2018.  Mechanism of injury-fall on stairs.  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  She was seen in urgent care where x-rays were negative.  MRI left knee showed grade 2 MCL sprain.  Patient states overall she is about 90% improved.  She was seen by Dr. Duque who did not recommend any further interventions.  She is completed physical therapy.  She is currently working full duty.     HPI    ROS       Objective:     /78   Pulse 90   Temp 36.6 °C (97.9 °F)   Resp 16   Ht 1.524 m (5')   Wt 67.6 kg (149 lb)   SpO2 92%   BMI 29.10 kg/m²      Physical Exam    Left knee: No gross deformity.  Full range of motion.  Normal gait.          Assessment/Plan:     1. Sprain of left knee, unspecified ligament, subsequent encounter  Released from care  Full duty  Follow-up as needed

## 2019-03-19 NOTE — LETTER
[address],   [address] [place] [address]  Phone:  [phone] - Fax:  [phone]   [facility] Progress Report and Disability Certification  Date of Service: 3/19/2019   No Show:  No  Date / Time of Next Visit:  MMI   Claim Information   Patient Name: Bernarda Raymond  Claim Number:     Employer:   [employer] Date of Injury: 11/2/2018     Insurer / TPA: [employer]  ID / SSN:     Occupation:   Diagnosis: The encounter diagnosis was Sprain of left knee, unspecified ligament, subsequent encounter.    Medical Information   Related to Industrial Injury? Yes    Subjective Complaints:  Date of injury 11/2/2018.  Mechanism of injury-fall on stairs.  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  She was seen in urgent care where x-rays were negative.  MRI left knee showed grade 2 MCL sprain.  Patient states overall she is about 90% improved.  She was seen by Dr. Duque who did not recommend any further interventions.  She is completed physical therapy.  She is currently working full duty.   Objective Findings: Left knee: No gross deformity.  Full range of motion.  Normal gait.      Pre-Existing Condition(s):     Assessment:   Condition Improved    Status: Discharged /  MMI  Permanent Disability:No    Plan:      Diagnostics:      Comments:  Released from care  Full duty  Follow-up as needed    Disability Information   Status: Released to Full Duty    From:  3/19/2019  Through:   Restrictions are:     Physical Restrictions   Sitting:    Standing:    Stooping:    Bending:      Squatting:    Walking:    Climbing:    Pushing:      Pulling:    Other:    Reaching Above Shoulder (L):   Reaching Above Shoulder (R):       Reaching Below Shoulder (L):    Reaching Below Shoulder (R):      Not to exceed Weight Limits   Carrying(hrs):   Weight Limit(lb):   Lifting(hrs):   Weight   Limit(lb):     Comments:      Repetitive Actions   Hands: i.e. Fine Manipulations from Grasping:     Feet: i.e. Operating Foot Controls:     Driving / Operate Machinery:     Physician Name: Denton Bolton D.O. Physician Signature: GordyTADENTON ASHER D.O. e-Signature: Dr. Mao Christiansen, Medical Director   Clinic Name / Location: [address],   [address] [place] [id number] Clinic Phone Number: Dept: [phone]   Appointment Time: 9:15 Am Visit Start Time: 8:58 AM   Check-In Time:  8:55 Am Visit Discharge Time:  9:20 AM   Original-Treating Physician or Chiropractor    Page 2-Insurer/TPA    Page 3-Employer    Page 4-Employee

## 2019-10-21 ENCOUNTER — OFFICE VISIT (OUTPATIENT)
Dept: NEUROLOGY | Facility: MEDICAL CENTER | Age: 54
End: 2019-10-21
Payer: MEDICAID

## 2019-10-21 VITALS
DIASTOLIC BLOOD PRESSURE: 70 MMHG | SYSTOLIC BLOOD PRESSURE: 120 MMHG | BODY MASS INDEX: 30.43 KG/M2 | OXYGEN SATURATION: 98 % | TEMPERATURE: 98.6 F | HEIGHT: 60 IN | WEIGHT: 155 LBS | RESPIRATION RATE: 16 BRPM | HEART RATE: 85 BPM

## 2019-10-21 DIAGNOSIS — G40.909 SEIZURE DISORDER (HCC): ICD-10-CM

## 2019-10-21 DIAGNOSIS — F43.12 CHRONIC POST-TRAUMATIC STRESS DISORDER (PTSD): ICD-10-CM

## 2019-10-21 PROCEDURE — 99203 OFFICE O/P NEW LOW 30 MIN: CPT | Performed by: NURSE PRACTITIONER

## 2019-10-21 RX ORDER — M-VIT,TX,IRON,MINS/CALC/FOLIC 27MG-0.4MG
1 TABLET ORAL DAILY
COMMUNITY
End: 2021-11-29 | Stop reason: SDUPTHER

## 2019-10-21 RX ORDER — CARBAMAZEPINE 200 MG/1
200 TABLET ORAL 2 TIMES DAILY
Refills: 6 | COMMUNITY
Start: 2019-10-14 | End: 2021-12-13 | Stop reason: SDUPTHER

## 2019-10-21 RX ORDER — MULTIVIT WITH MINERALS/LUTEIN
TABLET ORAL
COMMUNITY
End: 2021-11-29 | Stop reason: SDUPTHER

## 2019-10-21 RX ORDER — ESCITALOPRAM OXALATE 10 MG/1
10 TABLET ORAL EVERY MORNING
Refills: 0 | COMMUNITY
Start: 2019-09-19 | End: 2021-11-12

## 2019-10-21 ASSESSMENT — ENCOUNTER SYMPTOMS
DOUBLE VISION: 0
MUSCULOSKELETAL NEGATIVE: 1
CONSTITUTIONAL NEGATIVE: 1
SEIZURES: 1
VOMITING: 0
HEADACHES: 0
SORE THROAT: 0
DIARRHEA: 0
ABDOMINAL PAIN: 0
NAUSEA: 0
COUGH: 0

## 2019-10-21 ASSESSMENT — PATIENT HEALTH QUESTIONNAIRE - PHQ9: CLINICAL INTERPRETATION OF PHQ2 SCORE: 0
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
## 2019-10-22 ASSESSMENT — ENCOUNTER SYMPTOMS
NERVOUS/ANXIOUS: 1
DEPRESSION: 1

## 2019-11-05 ENCOUNTER — HOSPITAL ENCOUNTER (OUTPATIENT)
Dept: RADIOLOGY | Facility: MEDICAL CENTER | Age: 54
End: 2019-11-05
Attending: NURSE PRACTITIONER
Payer: MEDICAID

## 2019-11-05 DIAGNOSIS — G40.909 SEIZURE DISORDER (HCC): ICD-10-CM

## 2019-11-05 PROCEDURE — 70553 MRI BRAIN STEM W/O & W/DYE: CPT

## 2019-11-05 PROCEDURE — A9576 INJ PROHANCE MULTIPACK: HCPCS | Performed by: NURSE PRACTITIONER

## 2019-11-05 PROCEDURE — 700117 HCHG RX CONTRAST REV CODE 255: Performed by: NURSE PRACTITIONER

## 2019-11-05 RX ADMIN — GADOTERIDOL 15 ML: 279.3 INJECTION, SOLUTION INTRAVENOUS at 13:30

## 2019-11-06 ENCOUNTER — TELEPHONE (OUTPATIENT)
Dept: NEUROLOGY | Facility: MEDICAL CENTER | Age: 54
End: 2019-11-06

## 2020-05-27 ENCOUNTER — APPOINTMENT (OUTPATIENT)
Dept: RADIOLOGY | Facility: MEDICAL CENTER | Age: 55
End: 2020-05-27
Attending: EMERGENCY MEDICINE
Payer: OTHER MISCELLANEOUS

## 2020-05-27 ENCOUNTER — HOSPITAL ENCOUNTER (EMERGENCY)
Facility: MEDICAL CENTER | Age: 55
End: 2020-05-27
Attending: EMERGENCY MEDICINE
Payer: OTHER MISCELLANEOUS

## 2020-05-27 VITALS
RESPIRATION RATE: 14 BRPM | HEART RATE: 72 BPM | SYSTOLIC BLOOD PRESSURE: 133 MMHG | TEMPERATURE: 98 F | DIASTOLIC BLOOD PRESSURE: 73 MMHG | WEIGHT: 150 LBS | HEIGHT: 60 IN | OXYGEN SATURATION: 96 % | BODY MASS INDEX: 29.45 KG/M2

## 2020-05-27 DIAGNOSIS — R07.89 CHEST WALL PAIN: ICD-10-CM

## 2020-05-27 DIAGNOSIS — S13.9XXA NECK SPRAIN, INITIAL ENCOUNTER: ICD-10-CM

## 2020-05-27 DIAGNOSIS — S39.011A STRAIN OF ABDOMINAL WALL, INITIAL ENCOUNTER: ICD-10-CM
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
## 2020-05-27 DIAGNOSIS — S09.90XA CLOSED HEAD INJURY, INITIAL ENCOUNTER: ICD-10-CM

## 2020-05-27 LAB
ABO + RH BLD: NORMAL
ABO GROUP BLD: NORMAL
ALBUMIN SERPL BCP-MCNC: 4.2 G/DL (ref 3.2–4.9)
ALBUMIN/GLOB SERPL: 1.4 G/DL
ALP SERPL-CCNC: 109 U/L (ref 30–99)
ALT SERPL-CCNC: 14 U/L (ref 2–50)
ANION GAP SERPL CALC-SCNC: 12 MMOL/L (ref 7–16)
AST SERPL-CCNC: 18 U/L (ref 12–45)
BILIRUB SERPL-MCNC: <0.2 MG/DL (ref 0.1–1.5)
BLD GP AB SCN SERPL QL: NORMAL
BUN SERPL-MCNC: 6 MG/DL (ref 8–22)
CALCIUM SERPL-MCNC: 8.8 MG/DL (ref 8.5–10.5)
CHLORIDE SERPL-SCNC: 99 MMOL/L (ref 96–112)
CO2 SERPL-SCNC: 25 MMOL/L (ref 20–33)
CREAT SERPL-MCNC: 0.56 MG/DL (ref 0.5–1.4)
ERYTHROCYTE [DISTWIDTH] IN BLOOD BY AUTOMATED COUNT: 45 FL (ref 35.9–50)
ETHANOL BLD-MCNC: <10.1 MG/DL (ref 0–10.1)
GLOBULIN SER CALC-MCNC: 2.9 G/DL (ref 1.9–3.5)
GLUCOSE SERPL-MCNC: 95 MG/DL (ref 65–99)
HCG SERPL QL: NEGATIVE
HCT VFR BLD AUTO: 50.1 % (ref 37–47)
HGB BLD-MCNC: 17.5 G/DL (ref 12–16)
MCH RBC QN AUTO: 33.8 PG (ref 27–33)
MCHC RBC AUTO-ENTMCNC: 34.9 G/DL (ref 33.6–35)
MCV RBC AUTO: 96.9 FL (ref 81.4–97.8)
PLATELET # BLD AUTO: 298 K/UL (ref 164–446)
PMV BLD AUTO: 8.5 FL (ref 9–12.9)
POTASSIUM SERPL-SCNC: 4.1 MMOL/L (ref 3.6–5.5)
PROT SERPL-MCNC: 7.1 G/DL (ref 6–8.2)
RBC # BLD AUTO: 5.17 M/UL (ref 4.2–5.4)
RH BLD: NORMAL
SODIUM SERPL-SCNC: 136 MMOL/L (ref 135–145)
WBC # BLD AUTO: 8.4 K/UL (ref 4.8–10.8)

## 2020-05-27 PROCEDURE — 700117 HCHG RX CONTRAST REV CODE 255: Performed by: EMERGENCY MEDICINE

## 2020-05-27 PROCEDURE — A9270 NON-COVERED ITEM OR SERVICE: HCPCS | Performed by: EMERGENCY MEDICINE

## 2020-05-27 PROCEDURE — 71045 X-RAY EXAM CHEST 1 VIEW: CPT

## 2020-05-27 PROCEDURE — 700102 HCHG RX REV CODE 250 W/ 637 OVERRIDE(OP): Performed by: EMERGENCY MEDICINE

## 2020-05-27 PROCEDURE — 307740 HCHG GREEN TRAUMA TEAM SERVICES

## 2020-05-27 PROCEDURE — 84703 CHORIONIC GONADOTROPIN ASSAY: CPT

## 2020-05-27 PROCEDURE — 86901 BLOOD TYPING SEROLOGIC RH(D): CPT

## 2020-05-27 PROCEDURE — 80307 DRUG TEST PRSMV CHEM ANLYZR: CPT

## 2020-05-27 PROCEDURE — 72125 CT NECK SPINE W/O DYE: CPT

## 2020-05-27 PROCEDURE — 86850 RBC ANTIBODY SCREEN: CPT

## 2020-05-27 PROCEDURE — 74177 CT ABD & PELVIS W/CONTRAST: CPT

## 2020-05-27 PROCEDURE — 99284 EMERGENCY DEPT VISIT MOD MDM: CPT

## 2020-05-27 PROCEDURE — 86900 BLOOD TYPING SEROLOGIC ABO: CPT

## 2020-05-27 PROCEDURE — 80053 COMPREHEN METABOLIC PANEL: CPT

## 2020-05-27 PROCEDURE — 70450 CT HEAD/BRAIN W/O DYE: CPT

## 2020-05-27 PROCEDURE — 85027 COMPLETE CBC AUTOMATED: CPT

## 2020-05-27 RX ORDER — CYCLOBENZAPRINE HCL 10 MG
10 TABLET ORAL 3 TIMES DAILY PRN
Qty: 15 TAB | Refills: 0 | Status: SHIPPED | OUTPATIENT
Start: 2020-05-27 | End: 2021-11-29 | Stop reason: SDUPTHER

## 2020-05-27 RX ORDER — HYDROCODONE BITARTRATE AND ACETAMINOPHEN 5; 325 MG/1; MG/1
1 TABLET ORAL EVERY 6 HOURS PRN
Qty: 16 TAB | Refills: 0 | Status: SHIPPED | OUTPATIENT
Start: 2020-05-27 | End: 2020-06-01

## 2020-05-27 RX ORDER — HYDROCODONE BITARTRATE AND ACETAMINOPHEN 5; 325 MG/1; MG/1
1 TABLET ORAL ONCE
Status: COMPLETED | OUTPATIENT
Start: 2020-05-27 | End: 2020-05-27

## 2020-05-27 RX ADMIN — HYDROCODONE BITARTRATE AND ACETAMINOPHEN 1 TABLET: 5; 325 TABLET ORAL at 19:20

## 2020-05-27 RX ADMIN — IOHEXOL 100 ML: 350 INJECTION, SOLUTION INTRAVENOUS at 18:06
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
## 2021-11-12 ENCOUNTER — OFFICE VISIT (OUTPATIENT)
Dept: MEDICAL GROUP | Facility: CLINIC | Age: 56
End: 2021-11-12

## 2021-11-12 VITALS
SYSTOLIC BLOOD PRESSURE: 147 MMHG | OXYGEN SATURATION: 96 % | DIASTOLIC BLOOD PRESSURE: 85 MMHG | TEMPERATURE: 98 F | HEIGHT: 59 IN | HEART RATE: 87 BPM | WEIGHT: 171 LBS | RESPIRATION RATE: 12 BRPM | BODY MASS INDEX: 34.47 KG/M2

## 2021-11-12 DIAGNOSIS — Z00.00 ENCOUNTER FOR ANNUAL WELLNESS VISIT (AWV) IN MEDICARE PATIENT: ICD-10-CM

## 2021-11-12 DIAGNOSIS — Z00.00 PREVENTATIVE HEALTH CARE: ICD-10-CM

## 2021-11-12 PROCEDURE — G0439 PPPS, SUBSEQ VISIT: HCPCS | Performed by: STUDENT IN AN ORGANIZED HEALTH CARE EDUCATION/TRAINING PROGRAM

## 2021-11-12 RX ORDER — ESCITALOPRAM OXALATE 20 MG/1
20 TABLET ORAL DAILY
Qty: 60 TABLET | Refills: 3 | Status: SHIPPED | OUTPATIENT
Start: 2021-11-12 | End: 2021-11-29 | Stop reason: SDUPTHER

## 2021-11-12 ASSESSMENT — FIBROSIS 4 INDEX: FIB4 SCORE: 0.89

## 2021-11-12 NOTE — PROGRESS NOTES
Chief Complaint   Patient presents with   • Establish Care       HPI:  Bernarda Raymond is a 56 y.o. here for Medicare Annual Wellness Visit     Patient Active Problem List    Diagnosis Date Noted   • Seizure disorder (HCC) 10/21/2019   • Chronic post-traumatic stress disorder (PTSD) 10/21/2019       Current Outpatient Medications   Medication Sig Dispense Refill   • carBAMazepine (TEGRETOL) 200 MG Tab Take 200 mg by mouth 2 Times a Day.  6   • escitalopram (LEXAPRO) 10 MG Tab Take 10 mg by mouth every morning.  0   • cyclobenzaprine (FLEXERIL) 10 MG Tab Take 1 Tab by mouth 3 times a day as needed for Muscle Spasms. (Patient not taking: Reported on 11/12/2021) 15 Tab 0   • therapeutic multivitamin-minerals (THERAGRAN-M) Tab Take 1 Tab by mouth every day.     • Ascorbic Acid (VITAMIN C) 1000 MG Tab Take  by mouth. (Patient not taking: Reported on 11/12/2021)     • traZODone (DESYREL) 50 MG Tab Take 50 mg by mouth every evening. (Patient not taking: Reported on 11/12/2021)       No current facility-administered medications for this visit.          Current supplements as per medication list.     Allergies: Patient has no known allergies.    Current social contact/activities: chinese tea ceremonies     She is a former smoker having quit 6 months ago and previously heavy smoker. Does not drink ETOH or use any illicit drugs.    DPA/Advanced Directive:  Patient has Advanced Directive, but it is not on file. Instructed to bring in a copy to scan into their chart.    ROS:    Gait: Uses no assistive device  Ostomy: No  Other tubes: No  Amputations: No  Chronic oxygen use: No  Last eye exam: normal  Wears hearing aids: No   : Denies any urinary leakage during the last 6 months    Screening:  UTD  Depression Screening    Actively taking anti-depressants and working on meditation without any active SI    Interpretation of PHQ-9 Total Score   Active depression      Screening for Cognitive Impairment  No cognitive  impairment  Cognitive concerns identified deferred for follow up unless specifically addressed in assessment and plan.    Fall Risk Assessment    Has the patient had two or more falls in the last year or any fall with injury in the last year?   No    Safety Assessment  Handrails on all stairs.     Good lighting in all hallways.     Difficulty hearing.     Patient counseled about all safety risks that were identified.    Functional Assessment ADLs    Are there any barriers preventing you from cooking for yourself or meeting nutritional needs?   N.    Are there any barriers preventing you from driving safely or obtaining transportation?   N.    Are there any barriers preventing you from using a telephone or calling for help?N   .    Are there any barriers preventing you from shopping?   N.    Are there any barriers preventing you from taking care of your own finances?   N.    Are there any barriers preventing you from managing your medications?   N.    Are there any barriers preventing you from showering, bathing or dressing yourself?  N.    Are you currently engaging in any exercise or physical activity?   .Y     What is your perception of your health? fair  .    Health Maintenance Summary          Overdue - COLORECTAL CANCER SCREENING (COLONOSCOPY - Every 10 Years) Overdue - never done    No completion history exists for this topic.          Overdue - IMM PNEUMOCOCCAL VACCINE: 0-64 Years (1 of 2 - PPSV23) Overdue - never done    No completion history exists for this topic.          Overdue - PAP SMEAR (Every 3 Years) Overdue - never done    No completion history exists for this topic.          Overdue - MAMMOGRAM (Yearly) Overdue - never done    No completion history exists for this topic.          Overdue - IMM ZOSTER VACCINES (1 of 2) Overdue - never done    No completion history exists for this topic.          Overdue - IMM INFLUENZA (1) Overdue since 9/1/2021 03/13/2019  Outside Immunization: Influenza Quad  "Inj P    09/11/2013  Imm Admin: Influenza (IM) Preservative Free - HISTORICAL DATA          Overdue - COVID-19 Vaccine (3 - Booster for Moderna series) Overdue since 11/5/2021 05/05/2021  Imm Admin: Moderna SARS-CoV-2 Vaccine    04/07/2021  Imm Admin: Moderna SARS-CoV-2 Vaccine          IMM DTaP/Tdap/Td Vaccine (2 - Td or Tdap) Next due on 9/2/2022 09/02/2012  Imm Admin: Tdap Vaccine          IMM HEP B VACCINE (Series Information) Aged Out    No completion history exists for this topic.          IMM MENINGOCOCCAL VACCINE (MCV4) (Series Information) Aged Out    No completion history exists for this topic.                Patient Care Team:  Hua Veronica M.D. as PCP - General (Family Medicine)      Social History     Tobacco Use   • Smoking status: Current Every Day Smoker     Packs/day: 0.50   • Smokeless tobacco: Never Used   Substance Use Topics   • Alcohol use: No   • Drug use: No     No family history on file.  She  has no past medical history on file.   No past surgical history on file.    Exam:   /85 (BP Location: Right arm, Patient Position: Sitting, BP Cuff Size: Adult)   Pulse 87   Temp 36.7 °C (98 °F)   Resp 12   Ht 1.499 m (4' 11\")   Wt 77.6 kg (171 lb)   SpO2 96%  Body mass index is 34.54 kg/m².    Hearing good.    Dentition good  Alert, oriented in no acute distress.  Eye contact is good, speech goal directed, affect calm    Assessment and Plan. The following treatment and monitoring plan is recommended:    There are no diagnoses linked to this encounter.    Services suggested: No services needed at this time  Health Care Screening: Age-appropriate preventive services recommended by USPTF and ACIP covered by Medicare were discussed today. Services ordered if indicated and agreed upon by the patient.  Referrals offered: Community-based lifestyle interventions to reduce health risks and promote self-management and wellness, fall prevention, nutrition, physical activity, tobacco-use " cessation, weight loss, and mental health services as per orders if indicated.    Discussion today about general wellness and lifestyle habits:    · Prevent falls and reduce trip hazards; Cautioned about securing or removing rugs.  · Have a working fire alarm and carbon monoxide detector;   · Engage in regular physical activity and social activities     Follow-up:  6 months unless concerns prior

## 2021-11-29 RX ORDER — ESCITALOPRAM OXALATE 20 MG/1
20 TABLET ORAL DAILY
Qty: 60 TABLET | Refills: 3 | Status: SHIPPED | OUTPATIENT
Start: 2021-11-29 | End: 2022-04-07 | Stop reason: SDUPTHER

## 2021-11-29 RX ORDER — M-VIT,TX,IRON,MINS/CALC/FOLIC 27MG-0.4MG
1 TABLET ORAL DAILY
Qty: 30 TABLET | Refills: 0 | Status: SHIPPED
Start: 2021-11-29 | End: 2022-04-20

## 2021-11-29 RX ORDER — CYCLOBENZAPRINE HCL 10 MG
10 TABLET ORAL 3 TIMES DAILY PRN
Qty: 15 TABLET | Refills: 0 | Status: SHIPPED | OUTPATIENT
Start: 2021-11-29 | End: 2022-04-20 | Stop reason: SDUPTHER

## 2021-11-29 RX ORDER — MULTIVIT WITH MINERALS/LUTEIN
1000 TABLET ORAL DAILY
Qty: 30 TABLET | Refills: 0 | Status: SHIPPED
Start: 2021-11-29 | End: 2022-04-20

## 2021-11-29 NOTE — TELEPHONE ENCOUNTER
Received request via: Patient    Was the patient seen in the last year in this department? Yes    Does the patient have an active prescription (recently filled or refills available) for medication(s) requested? No       Patients called and ask for a pharmacy change. I did change the pharmacy and add a new one also I did request for a refills.

## 2021-12-13 RX ORDER — MIRTAZAPINE 15 MG/1
15 TABLET, FILM COATED ORAL
Qty: 30 TABLET | Refills: 0 | Status: SHIPPED | OUTPATIENT
Start: 2021-12-13 | End: 2022-02-23 | Stop reason: SDUPTHER

## 2021-12-13 RX ORDER — CARBAMAZEPINE 200 MG/1
200 TABLET ORAL 2 TIMES DAILY
Qty: 90 TABLET | Refills: 6 | Status: SHIPPED | OUTPATIENT
Start: 2021-12-13 | End: 2022-04-07 | Stop reason: SDUPTHER

## 2021-12-13 RX ORDER — MIRTAZAPINE 15 MG/1
15 TABLET, FILM COATED ORAL NIGHTLY
COMMUNITY
End: 2021-12-13 | Stop reason: SDUPTHER

## 2021-12-13 NOTE — TELEPHONE ENCOUNTER
Received request via: Patient    Was the patient seen in the last year in this department? Yes    Does the patient have an active prescription (recently filled or refills available) for medication(s) requested? No     Patients is completely out of her medicine.

## 2022-02-23 RX ORDER — MIRTAZAPINE 15 MG/1
15 TABLET, FILM COATED ORAL
Qty: 30 TABLET | Refills: 0 | Status: SHIPPED | OUTPATIENT
Start: 2022-02-23 | End: 2022-03-27 | Stop reason: SDUPTHER

## 2022-02-23 NOTE — TELEPHONE ENCOUNTER
Received request via: Patient    Was the patient seen in the last year in this department? Yes    Does the patient have an active prescription (recently filled or refills available) for medication(s) requested? No     Mirtazapine 15 MG

## 2022-03-30 RX ORDER — MIRTAZAPINE 15 MG/1
15 TABLET, FILM COATED ORAL
Qty: 30 TABLET | Refills: 0 | Status: SHIPPED | OUTPATIENT
Start: 2022-03-30 | End: 2022-05-08 | Stop reason: SDUPTHER

## 2022-04-10 RX ORDER — ESCITALOPRAM OXALATE 20 MG/1
20 TABLET ORAL DAILY
Qty: 60 TABLET | Refills: 3 | Status: SHIPPED | OUTPATIENT
Start: 2022-04-10 | End: 2022-12-06 | Stop reason: SDUPTHER

## 2022-04-10 RX ORDER — CARBAMAZEPINE 200 MG/1
200 TABLET ORAL 2 TIMES DAILY
Qty: 90 TABLET | Refills: 6 | Status: SHIPPED | OUTPATIENT
Start: 2022-04-10 | End: 2022-05-22 | Stop reason: SDUPTHER

## 2022-04-20 ENCOUNTER — OFFICE VISIT (OUTPATIENT)
Dept: MEDICAL GROUP | Facility: CLINIC | Age: 57
End: 2022-04-20
Payer: COMMERCIAL

## 2022-04-20 VITALS
DIASTOLIC BLOOD PRESSURE: 90 MMHG | HEIGHT: 59 IN | RESPIRATION RATE: 16 BRPM | OXYGEN SATURATION: 93 % | BODY MASS INDEX: 35.08 KG/M2 | SYSTOLIC BLOOD PRESSURE: 152 MMHG | HEART RATE: 84 BPM | WEIGHT: 174 LBS | TEMPERATURE: 97.8 F

## 2022-04-20 DIAGNOSIS — G89.29 CHRONIC BILATERAL LOW BACK PAIN WITHOUT SCIATICA: ICD-10-CM

## 2022-04-20 DIAGNOSIS — F31.81 BIPOLAR 2 DISORDER (HCC): ICD-10-CM

## 2022-04-20 DIAGNOSIS — M54.50 CHRONIC BILATERAL LOW BACK PAIN WITHOUT SCIATICA: ICD-10-CM

## 2022-04-20 PROBLEM — F17.200 SMOKING: Status: ACTIVE | Noted: 2019-03-13

## 2022-04-20 PROBLEM — G47.00 INSOMNIA: Status: ACTIVE | Noted: 2019-03-13

## 2022-04-20 PROCEDURE — 99214 OFFICE O/P EST MOD 30 MIN: CPT | Performed by: FAMILY MEDICINE

## 2022-04-20 RX ORDER — CYCLOBENZAPRINE HCL 10 MG
10 TABLET ORAL 3 TIMES DAILY PRN
Qty: 30 TABLET | Refills: 0 | Status: SHIPPED | OUTPATIENT
Start: 2022-04-20 | End: 2022-10-10

## 2022-04-20 ASSESSMENT — FIBROSIS 4 INDEX: FIB4 SCORE: 0.89

## 2022-04-20 NOTE — ASSESSMENT & PLAN NOTE
Continue medications for now.  I did discuss the possibility of seeing a psychiatrist and considering different medication for mood stabilization and she declines that.  Follow-up with me in 2 to 3 months.  I also offered repeating her labs which have not been done in 2 years and she declines that.  Not suicidal

## 2022-04-20 NOTE — PROGRESS NOTES
"Subjective:   CC: Bipolar with depression.    HPI:     Problem   Bipolar 2 Disorder (Hcc)    Patient comes in to follow-up bipolar disorder.  Recently exacerbated depressive symptoms.  She had some difficulty getting her prescriptions refilled but has finally gotten that done.  We reviewed those again together today and they are correct.  She is asking if Lexapro comes in a larger dose and it does not.  She does have a counselor that is rather new to her and has her third visit with the counselor today.  She does not have major side effects from her medications.  She states that they are very helpful.     Back pain    She intermittently takes cyclobenzaprine for this and would like a refill.  She uses it very sparingly only a few times per month.         Current Outpatient Medications Ordered in Epic   Medication Sig Dispense Refill   • cyclobenzaprine (FLEXERIL) 10 mg Tab Take 1 Tablet by mouth 3 times a day as needed for Muscle Spasms. 30 Tablet 0   • escitalopram (LEXAPRO) 20 MG tablet Take 1 Tablet by mouth every day. 60 Tablet 3   • carBAMazepine (TEGRETOL) 200 MG Tab Take 1 Tablet by mouth 2 times a day. 90 Tablet 6   • mirtazapine (REMERON) 15 MG Tab Take 1 Tablet by mouth at bedtime. 30 Tablet 0     No current Epic-ordered facility-administered medications on file.         ROS:  Gen: no fevers/chills  Pulm: no sob, no cough  CV: no chest pain          Objective:     Exam:  /90 (BP Location: Left arm, Patient Position: Sitting, BP Cuff Size: Adult)   Pulse 84   Temp 36.6 °C (97.8 °F) (Temporal)   Resp 16   Ht 1.499 m (4' 11\")   Wt 78.9 kg (174 lb)   SpO2 93%   BMI 35.14 kg/m²  Body mass index is 35.14 kg/m².    Gen: Alert and oriented, No apparent distress.  Tearful.        Assessment & Plan:     56 y.o. female with the following -     Problem List Items Addressed This Visit     Bipolar 2 disorder (HCC)     Continue medications for now.  I did discuss the possibility of seeing a psychiatrist and " considering different medication for mood stabilization and she declines that.  Follow-up with me in 2 to 3 months.  I also offered repeating her labs which have not been done in 2 years and she declines that.  Not suicidal         Back pain     I did refill the medication for sparing use only.  I did discuss with her the interactions with other medications that can cause sedation and she is aware of this.         Relevant Medications    cyclobenzaprine (FLEXERIL) 10 mg Tab                Return in about 3 months (around 7/20/2022).

## 2022-04-20 NOTE — ASSESSMENT & PLAN NOTE
I did refill the medication for sparing use only.  I did discuss with her the interactions with other medications that can cause sedation and she is aware of this.

## 2022-05-27 RX ORDER — CARBAMAZEPINE 200 MG/1
200 TABLET ORAL 2 TIMES DAILY
Qty: 90 TABLET | Refills: 6 | Status: SHIPPED | OUTPATIENT
Start: 2022-05-27 | End: 2022-12-06 | Stop reason: SDUPTHER

## 2022-06-29 ENCOUNTER — OFFICE VISIT (OUTPATIENT)
Dept: MEDICAL GROUP | Facility: CLINIC | Age: 57
End: 2022-06-29
Payer: COMMERCIAL

## 2022-06-29 VITALS
DIASTOLIC BLOOD PRESSURE: 77 MMHG | HEIGHT: 59 IN | TEMPERATURE: 97.6 F | HEART RATE: 96 BPM | WEIGHT: 173 LBS | RESPIRATION RATE: 16 BRPM | BODY MASS INDEX: 34.88 KG/M2 | SYSTOLIC BLOOD PRESSURE: 124 MMHG | OXYGEN SATURATION: 87 %

## 2022-06-29 DIAGNOSIS — F31.81 BIPOLAR 2 DISORDER (HCC): ICD-10-CM

## 2022-06-29 PROCEDURE — 99213 OFFICE O/P EST LOW 20 MIN: CPT | Performed by: FAMILY MEDICINE

## 2022-06-29 NOTE — PROGRESS NOTES
"Subjective:   CC: Follow-up bipolar disorder.    HPI:     Problem   Bipolar 2 Disorder (Hcc)    Patient comes in to follow-up bipolar disorder.  Prior to the last visit she had exacerbated depressive symptoms.  She had some difficulty getting her prescriptions refilled but has finally gotten that done.  We reviewed those again together today and they are correct.  She is seeing a counselor weekly and that is very helpful.  She does not have major side effects from her medications.  She states that they are very helpful.         Current Outpatient Medications Ordered in Epic   Medication Sig Dispense Refill   • carBAMazepine (TEGRETOL) 200 MG Tab Take 1 Tablet by mouth 2 times a day. 90 Tablet 6   • mirtazapine (REMERON) 15 MG Tab Take 1 Tablet by mouth at bedtime. 30 Tablet 3   • cyclobenzaprine (FLEXERIL) 10 mg Tab Take 1 Tablet by mouth 3 times a day as needed for Muscle Spasms. 30 Tablet 0   • escitalopram (LEXAPRO) 20 MG tablet Take 1 Tablet by mouth every day. 60 Tablet 3     No current Epic-ordered facility-administered medications on file.         ROS:  Gen: no fevers/chills  Pulm: no sob, no cough  CV: no chest pain, no palpitations  GI: no nausea/vomiting, no diarrhea        Objective:     Exam:  /77 (BP Location: Left arm, Patient Position: Sitting, BP Cuff Size: Adult)   Pulse 96   Temp 36.4 °C (97.6 °F) (Temporal)   Resp 16   Ht 1.499 m (4' 11\")   Wt 78.5 kg (173 lb)   SpO2 (!) 87%   BMI 34.94 kg/m²  Body mass index is 34.94 kg/m².    Gen: Alert and oriented, No apparent distress.        Assessment & Plan:     56 y.o. female with the following -     Problem List Items Addressed This Visit     Bipolar 2 disorder (HCC)     She is feeling quite stable as far as her bipolar 2 goes and she feels she is in a good place.  Recommended a follow-up visit in 6 months and continue her medications as is for now.                 I did discuss healthcare maintenance and screening with the patient and she " declines all screening including colon cancer screening, breast cancer screening, blood work such as lipid panels and HIV screening.      Return in about 6 months (around 12/29/2022).

## 2022-06-29 NOTE — ASSESSMENT & PLAN NOTE
She is feeling quite stable as far as her bipolar 2 goes and she feels she is in a good place.  Recommended a follow-up visit in 6 months and continue her medications as is for now.

## 2022-10-21 ENCOUNTER — PATIENT MESSAGE (OUTPATIENT)
Dept: MEDICAL GROUP | Facility: CLINIC | Age: 57
End: 2022-10-21
Payer: COMMERCIAL

## 2022-12-06 ENCOUNTER — OFFICE VISIT (OUTPATIENT)
Dept: MEDICAL GROUP | Facility: CLINIC | Age: 57
End: 2022-12-06
Payer: COMMERCIAL

## 2022-12-06 VITALS — WEIGHT: 174 LBS | BODY MASS INDEX: 34.16 KG/M2 | HEIGHT: 60 IN

## 2022-12-06 DIAGNOSIS — Z59.9 FINANCIAL DIFFICULTIES: ICD-10-CM

## 2022-12-06 DIAGNOSIS — F31.81 BIPOLAR 2 DISORDER (HCC): ICD-10-CM

## 2022-12-06 DIAGNOSIS — M54.50 CHRONIC BILATERAL LOW BACK PAIN WITHOUT SCIATICA: ICD-10-CM

## 2022-12-06 DIAGNOSIS — G89.29 CHRONIC BILATERAL LOW BACK PAIN WITHOUT SCIATICA: ICD-10-CM

## 2022-12-06 DIAGNOSIS — G40.909 SEIZURE DISORDER (HCC): ICD-10-CM

## 2022-12-06 PROCEDURE — 99214 OFFICE O/P EST MOD 30 MIN: CPT | Mod: GC | Performed by: STUDENT IN AN ORGANIZED HEALTH CARE EDUCATION/TRAINING PROGRAM

## 2022-12-06 RX ORDER — CARBAMAZEPINE 200 MG/1
200 TABLET ORAL 2 TIMES DAILY
Qty: 90 TABLET | Refills: 6 | Status: SHIPPED | OUTPATIENT
Start: 2022-12-06 | End: 2023-03-15 | Stop reason: SDUPTHER

## 2022-12-06 RX ORDER — ESCITALOPRAM OXALATE 20 MG/1
20 TABLET ORAL DAILY
Qty: 60 TABLET | Refills: 3 | Status: SHIPPED | OUTPATIENT
Start: 2022-12-06 | End: 2023-03-15 | Stop reason: SDUPTHER

## 2022-12-06 RX ORDER — MIRTAZAPINE 15 MG/1
15 TABLET, FILM COATED ORAL
Qty: 30 TABLET | Refills: 3 | Status: SHIPPED | OUTPATIENT
Start: 2022-12-06 | End: 2023-03-15 | Stop reason: SDUPTHER

## 2022-12-06 RX ORDER — CYCLOBENZAPRINE HCL 10 MG
10 TABLET ORAL 3 TIMES DAILY PRN
Qty: 30 TABLET | Refills: 0 | Status: SHIPPED | OUTPATIENT
Start: 2022-12-06 | End: 2023-03-15

## 2022-12-06 SDOH — ECONOMIC STABILITY - INCOME SECURITY: PROBLEM RELATED TO HOUSING AND ECONOMIC CIRCUMSTANCES, UNSPECIFIED: Z59.9
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
## 2022-12-07 NOTE — ASSESSMENT & PLAN NOTE
Patient reports being unemployed and unable to file for unemployment.  She currently lives in home by herself.  Patient was in agreement to discuss resources with social work.  Social work intern discussed resources with patient.  Recommend follow-up as needed.

## 2022-12-07 NOTE — ASSESSMENT & PLAN NOTE
Chronic.  Controlled on current regimen.  She reports history of being diagnosed with bipolar many years ago, and was previously managed by psychiatry but has been stable on current regimen.    -Continue current regimen of mirtazapine 15 mg nightly, escitalopram 20 mg daily, and carbamazepine 200 mg 2 times daily  -Recommend follow-up PCP 1 month

## 2023-03-13 ENCOUNTER — PATIENT MESSAGE (OUTPATIENT)
Dept: MEDICAL GROUP | Facility: CLINIC | Age: 58
End: 2023-03-13
Payer: COMMERCIAL

## 2023-04-10 ENCOUNTER — OFFICE VISIT (OUTPATIENT)
Dept: MEDICAL GROUP | Facility: CLINIC | Age: 58
End: 2023-04-10
Payer: MEDICAID

## 2023-04-10 DIAGNOSIS — F31.81 BIPOLAR 2 DISORDER (HCC): ICD-10-CM

## 2023-04-10 PROCEDURE — 90834 PSYTX W PT 45 MINUTES: CPT | Performed by: PSYCHOLOGIST

## 2023-05-16 ENCOUNTER — OFFICE VISIT (OUTPATIENT)
Dept: MEDICAL GROUP | Facility: CLINIC | Age: 58
End: 2023-05-16
Payer: MEDICAID

## 2023-05-16 VITALS
WEIGHT: 176 LBS | DIASTOLIC BLOOD PRESSURE: 80 MMHG | SYSTOLIC BLOOD PRESSURE: 124 MMHG | OXYGEN SATURATION: 95 % | BODY MASS INDEX: 34.55 KG/M2 | TEMPERATURE: 97.9 F | HEART RATE: 75 BPM | HEIGHT: 60 IN

## 2023-05-16 DIAGNOSIS — G40.909 SEIZURE DISORDER (HCC): ICD-10-CM

## 2023-05-16 DIAGNOSIS — F31.81 BIPOLAR 2 DISORDER (HCC): ICD-10-CM

## 2023-05-16 PROCEDURE — 3079F DIAST BP 80-89 MM HG: CPT | Performed by: STUDENT IN AN ORGANIZED HEALTH CARE EDUCATION/TRAINING PROGRAM

## 2023-05-16 PROCEDURE — 99213 OFFICE O/P EST LOW 20 MIN: CPT | Mod: GE | Performed by: STUDENT IN AN ORGANIZED HEALTH CARE EDUCATION/TRAINING PROGRAM

## 2023-05-16 PROCEDURE — 3074F SYST BP LT 130 MM HG: CPT | Performed by: STUDENT IN AN ORGANIZED HEALTH CARE EDUCATION/TRAINING PROGRAM

## 2023-05-16 RX ORDER — CARBAMAZEPINE 200 MG/1
600 TABLET ORAL DAILY
Qty: 90 TABLET | Refills: 0 | Status: SHIPPED | OUTPATIENT
Start: 2023-05-16 | End: 2023-06-15

## 2023-05-16 NOTE — PROGRESS NOTES
Subjective:     CC: Follow up     HPI:   Bernarda Rothman presents today with    Problem   Bipolar 2 Disorder (Hcc)    Patient has history of bipolar   No manic episodes since last appointment.    She is currently taking mirtazapine 15 mg daily Lexapro 20 mg daily, and, carbamazepine 200 mg 2 times daily.  She reports mood has been fluctuating more frequently   She would like to increase dose of carbamazepine   She has increased dose on her own but not noticed difference.   Overall sleeping okay           Seizure Disorder (Hcc)    History of seizures   Currently taking Carbamezapine   Previously seen neurology   Last seizure reported multiple years ago            ROS: See HPI.     Objective:     Exam:  /80 (BP Location: Right arm, Patient Position: Sitting, BP Cuff Size: Adult)   Pulse 75   Temp 36.6 °C (97.9 °F) (Temporal)   Ht 1.524 m (5')   Wt 79.8 kg (176 lb)   SpO2 95%   BMI 34.37 kg/m²  Body mass index is 34.37 kg/m².    Physical Exam:  General: Pt resting in NAD, cooperative   Skin:  No cyanosis or jaundice   HEENT: NC/AT. EOMI. No conjunctival injection or sclera icterus.   Lungs:  CTAB, good air movement. Non-labored.   Cardiovascular:  S1/S2 RRR   CNS:  No gross focal neurologic deficits  Psych: Appropriate mood and affect       Assessment & Plan:     57 y.o. female with the following -     Problem List Items Addressed This Visit       Seizure disorder (HCC)     Chronic.  Controlled.  Continue current regimen.           Relevant Medications    carBAMazepine (TEGRETOL) 200 MG Tab    Bipolar 2 disorder (HCC)     Chronic.  Uncontrolled.  Patient is not currently following up with a psychiatrist, and plans to follow-up with psychiatrist soon as possible.  She has been stable on her current regimen for long period of time, and is recently experienced some mood instability.  Patient would like to increase her dosage of carbamazepine.  Discussed with patient that typically this is managed by psychiatrist,  but given unknown duration of time to establish with psychiatrist and instability of symptoms is reasonable to increase her dosage of mirtazapine with close follow-up.  Discussed with patient to monitor for worsening symptoms.  Discussed resources.  Recommend close follow-up in 1 to 2 weeks.           Relevant Medications    carBAMazepine (TEGRETOL) 200 MG Tab

## 2023-05-16 NOTE — ASSESSMENT & PLAN NOTE
Chronic.  Uncontrolled.  Patient is not currently following up with a psychiatrist, and plans to follow-up with psychiatrist soon as possible.  She has been stable on her current regimen for long period of time, and is recently experienced some mood instability.  Patient would like to increase her dosage of carbamazepine.  Discussed with patient that typically this is managed by psychiatrist, but given unknown duration of time to establish with psychiatrist and instability of symptoms is reasonable to increase her dosage of mirtazapine with close follow-up.  Discussed with patient to monitor for worsening symptoms.  Discussed resources.  Recommend close follow-up in 1 to 2 weeks.

## 2023-05-17 DIAGNOSIS — Z51.81 THERAPEUTIC DRUG MONITORING: ICD-10-CM

## 2023-05-31 ENCOUNTER — APPOINTMENT (OUTPATIENT)
Dept: LAB | Facility: MEDICAL CENTER | Age: 58
End: 2023-05-31
Payer: MEDICAID

## 2023-06-01 ENCOUNTER — PATIENT MESSAGE (OUTPATIENT)
Dept: MEDICAL GROUP | Facility: CLINIC | Age: 58
End: 2023-06-01
Payer: COMMERCIAL

## 2023-06-01 DIAGNOSIS — E87.6 HYPOKALEMIA: ICD-10-CM

## 2023-06-01 DIAGNOSIS — D75.1 ERYTHROCYTOSIS: ICD-10-CM

## 2023-06-01 DIAGNOSIS — E87.5 HYPERKALEMIA: ICD-10-CM

## 2023-06-02 ENCOUNTER — TELEPHONE (OUTPATIENT)
Dept: MEDICAL GROUP | Facility: CLINIC | Age: 58
End: 2023-06-02
Payer: COMMERCIAL

## 2023-06-02 NOTE — TELEPHONE ENCOUNTER
Phone Number Called: [phone] (home)       Call outcome: Spoke to patient regarding message below.    Message: Spoke to pt, I will be sending out her  lab order to her home

## 2023-06-19 DIAGNOSIS — D75.1 ERYTHROCYTOSIS: ICD-10-CM

## 2023-06-20 DIAGNOSIS — D75.1 ERYTHROCYTOSIS: ICD-10-CM

## 2023-06-22 ENCOUNTER — OFFICE VISIT (OUTPATIENT)
Dept: MEDICAL GROUP | Facility: CLINIC | Age: 58
End: 2023-06-22
Payer: MEDICAID

## 2023-06-22 VITALS
HEIGHT: 60 IN | DIASTOLIC BLOOD PRESSURE: 78 MMHG | WEIGHT: 168 LBS | OXYGEN SATURATION: 99 % | SYSTOLIC BLOOD PRESSURE: 124 MMHG | HEART RATE: 86 BPM | BODY MASS INDEX: 32.98 KG/M2 | TEMPERATURE: 97.7 F

## 2023-06-22 DIAGNOSIS — D75.1 ERYTHROCYTOSIS: ICD-10-CM

## 2023-06-22 PROCEDURE — 3078F DIAST BP <80 MM HG: CPT

## 2023-06-22 PROCEDURE — 3074F SYST BP LT 130 MM HG: CPT

## 2023-06-22 PROCEDURE — 99213 OFFICE O/P EST LOW 20 MIN: CPT | Mod: GE

## 2023-06-22 NOTE — PROGRESS NOTES
SUBJECTIVE:     CC: to review lab results    HISTORY OF THE PRESENT ILLNESS:   Patient is a 57 y.o. female. This pleasant patient is here today to review her recent lab results from 5/31/2023 and 6/8/2023. Her lab results was notable for consistently elevated RBC counts, H&H. Erythropoietin was also elevated at 18.9. Patient states she has a history of seizure that is controlled with Carbamezapine. Her blood level was 9.0, within normal limits. Patient admits a family history of skin cancer. Patient states she take low dose Aspirin daily. Lab order for JAK2 mutation and hematology referral were placed by Dr. Reid.     Patient Active Problem List   Diagnosis    Seizure disorder (HCC)    Chronic post-traumatic stress disorder (PTSD)    Encounter for annual wellness visit (AWV) in Medicare patient    Bipolar 2 disorder (HCC)    Back pain    Insomnia    Smoking    Financial difficulties    Erythrocytosis     Surgical History:  No past surgical history on file.    Family History:  No family history on file.    Social History     Tobacco Use    Smoking status: Light Smoker     Packs/day: 0.50     Types: Cigarettes    Smokeless tobacco: Never   Vaping Use    Vaping Use: Every day    Substances: Nicotine   Substance Use Topics    Alcohol use: No    Drug use: No     Current Outpatient Medications on File Prior to Visit   Medication Sig Dispense Refill    cariprazine (VRAYLAR) 1.5 MG capsule Take  by mouth every day.      cyclobenzaprine (FLEXERIL) 10 mg Tab TAKE ONE TABLET BY MOUTH THREE TIMES A DAY AS NEEDED FOR MUSCLE SPASMS 30 Tablet 1    escitalopram (LEXAPRO) 20 MG tablet Take 1 Tablet by mouth every day. 60 Tablet 1    mirtazapine (REMERON) 15 MG Tab Take 1 Tablet by mouth at bedtime. 30 Tablet 3     No current facility-administered medications on file prior to visit.     No Known Allergies      ROS:   Gen: no fevers/chills, no changes in weight  Eyes: no changes in vision  ENT: no changes in hearing  Pulm:  no sob, no cough  CV: no chest pain, no palpitations  GI: no nausea/vomiting, no diarrhea  MSk: no myalgias  Skin: no rash  Neuro: no headaches, no numbness/tingling      OBJECTIVE:     Exam: /78 (BP Location: Left arm, Patient Position: Sitting)   Pulse 86   Temp 36.5 °C (97.7 °F) (Temporal)   Ht 1.524 m (5')   Wt 76.2 kg (168 lb)   SpO2 99%  Body mass index is 32.81 kg/m².    General: Normal appearing. No distress.  HEENT: Normocephalic.   Pulmonary: Clear to ausculation.  Normal effort. No rales, ronchi, or wheezing.  Cardiovascular: Regular rate and rhythm without murmur. Carotid and radial pulses are intact and equal bilaterally.  Abdomen: Soft, nontender, nondistended. Normal bowel sounds.  Neurologic: Grossly nonfocal.  Lymph: No cervical or supraclavicular lymph nodes are palpable  Skin: Warm and dry.  No obvious lesions.  Musculoskeletal: Normal gait. No extremity cyanosis, clubbing, or edema.  Psych: Normal mood and affect. Alert and oriented x3. Judgment and insight is normal.    ASSESSMENT & PLAN:   57 y.o. female with the following -    #Lab Result Review  #Erythrocytosis  Patient is a 57 y.o. female. This pleasant patient is here today to review her recent lab results from 5/31/2023 and 6/8/2023. Her lab results was notable for consistently elevated RBC counts, H&H. Erythropoietin was also elevated at 18.9. Patient states she has a history of seizure that is controlled with Carbamezapine. Her blood level was 9.0, within normal limits. Patient admits a family history of skin cancer. Lab order for JAK2 mutation and hematology referral were placed by Dr. Reid.     - Patient provided with hard copies of the lab order for JAK2 mutation   - Patient provided with hard copy of the referral and authorized contact information to hematology.  - Follow up after lab result becomes available.    Yasmany Irving, PGY-1  [facility]

## 2023-07-13 ENCOUNTER — OFFICE VISIT (OUTPATIENT)
Dept: MEDICAL GROUP | Facility: CLINIC | Age: 58
End: 2023-07-13
Payer: MEDICAID

## 2023-07-13 VITALS
DIASTOLIC BLOOD PRESSURE: 76 MMHG | WEIGHT: 164 LBS | TEMPERATURE: 97.3 F | SYSTOLIC BLOOD PRESSURE: 126 MMHG | HEART RATE: 86 BPM | BODY MASS INDEX: 32.2 KG/M2 | HEIGHT: 60 IN | OXYGEN SATURATION: 89 %

## 2023-07-13 DIAGNOSIS — D75.1 ERYTHROCYTOSIS: ICD-10-CM

## 2023-07-13 PROCEDURE — 3078F DIAST BP <80 MM HG: CPT

## 2023-07-13 PROCEDURE — 99213 OFFICE O/P EST LOW 20 MIN: CPT | Mod: GE

## 2023-07-13 PROCEDURE — 3074F SYST BP LT 130 MM HG: CPT

## 2023-07-13 RX ORDER — CARBAMAZEPINE 200 MG/1
TABLET ORAL
COMMUNITY
Start: 2023-06-29

## 2023-07-13 NOTE — PROGRESS NOTES
SUBJECTIVE:     CC: review lab result    HPI:   Bernarda Rothman presents today with her mom following up on her lab result for JAK2 mutation.  Her lab results from 5/31/2023 and 6/8/2023 were notable for consistently elevated RBC counts, H&H. Erythropoietin was also elevated at 18.9. Patient states she has a history of seizure that is controlled with Carbamezapine, with a blood level was 9.0, within normal limits. Patient admits a family history of skin cancer. JAK2 mutation lab showed no detection. Hematology referral placed by Dr. Stoddard. Patient states she has an appointment coming up in October.     Patient Active Problem List   Diagnosis    Seizure disorder (HCC)    Chronic post-traumatic stress disorder (PTSD)    Encounter for annual wellness visit (AWV) in Medicare patient    Bipolar 2 disorder (HCC)    Back pain    Insomnia    Smoking    Financial difficulties    Erythrocytosis     Surgical History:  No past surgical history on file.    Family History:  No family history on file.    Social History     Tobacco Use    Smoking status: Light Smoker     Packs/day: 0.50     Types: Cigarettes    Smokeless tobacco: Never   Vaping Use    Vaping Use: Every day    Substances: Nicotine   Substance Use Topics    Alcohol use: No    Drug use: No     Medications:  Current Outpatient Medications on File Prior to Visit   Medication Sig Dispense Refill    carBAMazepine (TEGRETOL) 200 MG Tab       cyclobenzaprine (FLEXERIL) 10 mg Tab TAKE ONE TABLET BY MOUTH THREE TIMES A DAY AS NEEDED FOR MUSCLE SPASMS 30 Tablet 1    escitalopram (LEXAPRO) 20 MG tablet Take 1 Tablet by mouth every day. 60 Tablet 1    cariprazine (VRAYLAR) 1.5 MG capsule Take  by mouth every day. (Patient not taking: Reported on 7/13/2023)      mirtazapine (REMERON) 15 MG Tab Take 1 Tablet by mouth at bedtime. (Patient not taking: Reported on 7/13/2023) 30 Tablet 3     No current facility-administered medications on file prior to visit.     No Known  Allergies      ROS:   Gen: no fevers/chills, no changes in weight  Eyes: no changes in vision  ENT: no changes in hearing  Pulm: no sob, no cough  CV: no chest pain, no palpitations  GI: no nausea/vomiting, no diarrhea  MSk: no myalgias  Skin: no rash  Neuro: no headaches, no numbness/tingling      OBJECTIVE:     Exam:  /76 (BP Location: Right arm, Patient Position: Sitting)   Pulse 86   Temp 36.3 °C (97.3 °F) (Temporal)   Ht 1.524 m (5')   Wt 74.4 kg (164 lb)   SpO2 89%   BMI 32.03 kg/m²  Body mass index is 32.03 kg/m².    Gen: Alert and oriented, No apparent distress.  Head:  NCAT, EOMI, sclera clear without discharge  Neck: Neck is supple without lymphadenopathy.  Lungs: Normal effort, CTA bilaterally, no wheezes, rhonchi, or rales  CV: Regular rate and rhythm. No murmurs, rubs, or gallops.  Abd:   Non-distended, soft  Ext: No clubbing, cyanosis, edema.  MSK: Unassisted gait  Psych: normal mood and affect      ASSESSMENT & PLAN:     57 y.o. female with the following -    #Lab Result Review  #Erythrocytosis  Bernarda Rothman presents today with her mom following up on her lab result for JAK2 mutation.  Her lab results from 5/31/2023 and 6/8/2023 were notable for consistently elevated RBC counts, H&H. EPO was elevated at 18.9. JAK2 mutation lab result showed no detection. Hematology was referral placed by Dr. Reid. Patient states she has an appointment coming up in October.     - Patient to follow up on upcoming hematology appointment on October 19th.  - Smoking cessation recommended.  - Continue low dose Aspirin.  - ER precautions discussed.      Yasmany Irving, PGY-2  [facility]

## 2023-08-14 NOTE — PROGRESS NOTES
08/21/23    Subjective    Chief Complaint:  Polycythemia    HPI:  57 female referred for consultation by Dr. Reid because of elevated H/H. Outside labs from [facility] show a recent H/H of 20.4/56.9 and negative MARISA 2 mutation studies including reflex to CALR and MPL. She is a smoker and also carries a dx of bipolar disorder. WBC, plt # and CMP are normal. Epo was elevated at 18.9. She is not sure - she may have sleep apnea but does not wake up gasping for breath. She is down to 1 ppd smoking. She says the blood bank won't take her blood because she had malaria about 5 years ago - was living in [facility]. She ius on avariety of meds for bipolar disorder and is undr some emotional stress.     ROS:    Constitutional: No weight loss  Skin: No rash or jaundice  HENT: No change in eyesight or hearing  Cardiovascular:No chest pain or arrythmia  Respiratory:No cough or SOB  GI:No nausea, vomiting, diarrhea, constipation  :No dysuria or frequency  Musculoskeletal:No bone or joint pain  Neuro:No sx's of neuropathy  Psych: No complaints    PMH:      No Known Allergies    History reviewed. No pertinent past medical history.     History reviewed. No pertinent surgical history.     Medications:    Current Outpatient Medications on File Prior to Encounter   Medication Sig Dispense Refill    prazosin (MINIPRESS) 1 MG Cap Take 1 mg by mouth at bedtime.      carBAMazepine (TEGRETOL) 200 MG Tab       cyclobenzaprine (FLEXERIL) 10 mg Tab TAKE ONE TABLET BY MOUTH THREE TIMES A DAY AS NEEDED FOR MUSCLE SPASMS 30 Tablet 1    escitalopram (LEXAPRO) 20 MG tablet Take 1 Tablet by mouth every day. 60 Tablet 1    cariprazine (VRAYLAR) 1.5 MG capsule Take  by mouth every day. (Patient not taking: Reported on 7/13/2023)      mirtazapine (REMERON) 15 MG Tab Take 1 Tablet by mouth at bedtime. (Patient not taking: Reported on 7/13/2023) 30 Tablet 3     No current facility-administered medications on file prior to encounter.       Social History      Tobacco Use    Smoking status: Light Smoker     Packs/day: .5     Types: Cigarettes    Smokeless tobacco: Never   Substance Use Topics    Alcohol use: No        History reviewed. No pertinent family history.     Objective    Vitals:    /62 (BP Location: Right arm, Patient Position: Sitting, BP Cuff Size: Adult)   Pulse 88   Temp 36.6 °C (97.8 °F) (Temporal)   Ht 1.524 m (5')   Wt 74.7 kg (164 lb 12.7 oz)   SpO2 88%   BMI 32.18 kg/m²     Physical Exam:    Appears well-developed and well-nourished. No distress.    Head -  Normocephalic .   Eyes - Pupils are equal. Conjunctivae normal. No scleral icterus.   Ears - normal hearing  Mouth - Throat: Oropharynx is clear and moist. No oropharyngeal exudate  Neck - Neck supple. No thyromegaly  Cardiovascular - Normal rate, regular rhythm, normal heart sounds and intact distal pulses. No  gallop, murmur or rub  Pulmonary - Normal breath sounds.  No wheeze, rales or rhonchi  Breast - symmetrical. No mass on indentation.  Abdominal -Soft. No distension, tenderness, organomegaly or mass  Extremities-  No edema or tenderness.    Nodes - No submental, submandibular, preauricular, cervical, axillary or inguinal adenopathy.    Neurological -   Alert and oriented.  Skin - Skin is warm and dry. No rash noted. Not diaphoretic. No erythema. No pallor. No jaundice   Psychiatric -  Normal mood and affect.    Labs:     Latest Reference Range & Units 05/27/20 17:56   WBC 4.8 - 10.8 K/uL 8.4   RBC 4.20 - 5.40 M/uL 5.17   Hemoglobin 12.0 - 16.0 g/dL 17.5 (H)   Hematocrit 37.0 - 47.0 % 50.1 (H)   MCV 81.4 - 97.8 fL 96.9   MCH 27.0 - 33.0 pg 33.8 (H)   MCHC 33.6 - 35.0 g/dL 34.9   RDW 35.9 - 50.0 fL 45.0   Platelet Count 164 - 446 K/uL 298     Assessment  Secondary polycythemia most likely related to long smoking hisotry  Imp:    Visit Diagnosis:    1. Erythrocytosis          Plan:  Therapeutic phlebotmoy q month x 3 and then come back to see me again    Heri King,  M.D.

## 2023-08-21 ENCOUNTER — HOSPITAL ENCOUNTER (OUTPATIENT)
Dept: HEMATOLOGY ONCOLOGY | Facility: MEDICAL CENTER | Age: 58
End: 2023-08-21
Attending: INTERNAL MEDICINE
Payer: COMMERCIAL

## 2023-08-21 VITALS
DIASTOLIC BLOOD PRESSURE: 62 MMHG | BODY MASS INDEX: 32.35 KG/M2 | HEIGHT: 60 IN | WEIGHT: 164.79 LBS | OXYGEN SATURATION: 88 % | TEMPERATURE: 97.8 F | SYSTOLIC BLOOD PRESSURE: 106 MMHG | HEART RATE: 88 BPM

## 2023-08-21 DIAGNOSIS — D75.1 ERYTHROCYTOSIS: ICD-10-CM

## 2023-08-21 PROCEDURE — 99212 OFFICE O/P EST SF 10 MIN: CPT | Performed by: INTERNAL MEDICINE

## 2023-08-21 PROCEDURE — 99204 OFFICE O/P NEW MOD 45 MIN: CPT | Performed by: INTERNAL MEDICINE

## 2023-08-21 RX ORDER — SODIUM CHLORIDE 9 MG/ML
500 INJECTION, SOLUTION INTRAVENOUS ONCE
Status: CANCELLED | OUTPATIENT
Start: 2023-08-29 | End: 2023-08-29

## 2023-08-21 RX ORDER — PRAZOSIN HYDROCHLORIDE 1 MG/1
1 CAPSULE ORAL
COMMUNITY
Start: 2023-08-18

## 2023-08-21 ASSESSMENT — PAIN SCALES - GENERAL: PAINLEVEL: NO PAIN

## 2023-08-30 ENCOUNTER — OUTPATIENT INFUSION SERVICES (OUTPATIENT)
Dept: ONCOLOGY | Facility: MEDICAL CENTER | Age: 58
End: 2023-08-30
Attending: INTERNAL MEDICINE
Payer: MEDICAID

## 2023-08-30 VITALS
RESPIRATION RATE: 18 BRPM | TEMPERATURE: 97.1 F | HEIGHT: 59 IN | SYSTOLIC BLOOD PRESSURE: 137 MMHG | BODY MASS INDEX: 33.11 KG/M2 | OXYGEN SATURATION: 91 % | WEIGHT: 164.24 LBS | DIASTOLIC BLOOD PRESSURE: 78 MMHG | HEART RATE: 95 BPM

## 2023-08-30 DIAGNOSIS — D75.1 ERYTHROCYTOSIS: ICD-10-CM

## 2023-08-30 LAB
HCT VFR BLD AUTO: 54 % (ref 37–47)
HGB BLD-MCNC: 17.9 G/DL (ref 12–16)

## 2023-08-30 PROCEDURE — 85014 HEMATOCRIT: CPT

## 2023-08-30 PROCEDURE — 99195 PHLEBOTOMY: CPT

## 2023-08-30 PROCEDURE — 85018 HEMOGLOBIN: CPT

## 2023-08-30 RX ORDER — SODIUM CHLORIDE 9 MG/ML
500 INJECTION, SOLUTION INTRAVENOUS ONCE
Status: CANCELLED | OUTPATIENT
Start: 2023-08-30 | End: 2023-08-30

## 2023-08-31 NOTE — PROGRESS NOTES
Pt arrived ambulatory to  for first therapeutic phlebotomy. POC discussed and pt verbalized understanding. PIV placed without difficulty, brisk blood return noted, labs drawn and pt tolerated well. Hgb 17.9 and Hct 54. 500 ml whole blood removed per [facility] policy and pt tolerated well. Pt monitored for 15 mins, had a snack and orthostatic VS taken. Pt tolerated well and VS within discharge parameters. Pt denies s/s of syncope, chest pain, SOB and states she feels well enough to discharge home. Pt educated on self care and pt verbalized understanding. PIV removed with tip intact, site covered with sterile gauze/coban. Confirmed next appt and pt discharged to self care in [facility].

## 2023-09-28 ENCOUNTER — OUTPATIENT INFUSION SERVICES (OUTPATIENT)
Dept: ONCOLOGY | Facility: MEDICAL CENTER | Age: 58
End: 2023-09-28
Attending: INTERNAL MEDICINE
Payer: COMMERCIAL

## 2023-09-28 VITALS
TEMPERATURE: 98 F | RESPIRATION RATE: 18 BRPM | WEIGHT: 162.92 LBS | HEIGHT: 59 IN | SYSTOLIC BLOOD PRESSURE: 135 MMHG | BODY MASS INDEX: 32.84 KG/M2 | OXYGEN SATURATION: 93 % | DIASTOLIC BLOOD PRESSURE: 78 MMHG | HEART RATE: 95 BPM

## 2023-09-28 DIAGNOSIS — D75.1 ERYTHROCYTOSIS: ICD-10-CM

## 2023-09-28 LAB
HCT VFR BLD AUTO: 54.6 % (ref 37–47)
HGB BLD-MCNC: 18.6 G/DL (ref 12–16)

## 2023-09-28 PROCEDURE — 85014 HEMATOCRIT: CPT

## 2023-09-28 PROCEDURE — 85018 HEMOGLOBIN: CPT

## 2023-09-28 PROCEDURE — 99195 PHLEBOTOMY: CPT

## 2023-09-28 RX ORDER — SODIUM CHLORIDE 9 MG/ML
500 INJECTION, SOLUTION INTRAVENOUS ONCE
Status: CANCELLED | OUTPATIENT
Start: 2023-09-28 | End: 2023-09-28

## 2023-09-29 NOTE — PROGRESS NOTES
Bernarda arrives for the therapeutic phlebotomy. Labs drawn as ordered by MD.  Bernarda's Hgb 18.6 , Hct 54.6 %. 500 cc blood removed. Bernarda tolerated well. When obtaining first set of orthostatic vitals, Bernarda denied dizziness but did not like BP of 98/69. Provided water to help hydrate patient. Obtained vitals after additional water. Orthostatic vitals stable, see flowsheet. Bernarda denies chest pain, shortness of breath, dizziness, or lightheadedness after treatment. Next appointment scheduled, Bernarda discharged home to self care.

## 2023-10-26 ENCOUNTER — OUTPATIENT INFUSION SERVICES (OUTPATIENT)
Dept: ONCOLOGY | Facility: MEDICAL CENTER | Age: 58
End: 2023-10-26
Attending: INTERNAL MEDICINE
Payer: COMMERCIAL

## 2023-10-26 VITALS
DIASTOLIC BLOOD PRESSURE: 71 MMHG | WEIGHT: 163.8 LBS | RESPIRATION RATE: 16 BRPM | TEMPERATURE: 98 F | HEIGHT: 60 IN | HEART RATE: 102 BPM | OXYGEN SATURATION: 94 % | BODY MASS INDEX: 32.16 KG/M2 | SYSTOLIC BLOOD PRESSURE: 124 MMHG

## 2023-10-26 DIAGNOSIS — D75.1 ERYTHROCYTOSIS: ICD-10-CM

## 2023-10-26 LAB
HCT VFR BLD AUTO: 53.2 % (ref 37–47)
HGB BLD-MCNC: 17.8 G/DL (ref 12–16)

## 2023-10-26 PROCEDURE — 85014 HEMATOCRIT: CPT

## 2023-10-26 PROCEDURE — 99195 PHLEBOTOMY: CPT

## 2023-10-26 PROCEDURE — 85018 HEMOGLOBIN: CPT

## 2023-10-26 RX ORDER — SODIUM CHLORIDE 9 MG/ML
500 INJECTION, SOLUTION INTRAVENOUS ONCE
Status: CANCELLED | OUTPATIENT
Start: 2023-10-26 | End: 2023-10-26

## 2023-10-26 NOTE — PROGRESS NOTES
Pt arrives for therapeutic phlebotomy for erthrocytosis.  Discussed plan of care with pt.  PIV established to L-AC and H&H was drawn.  Hemoglobin is 17.8 and hematocrit is 53.2% today.  Results meet parameters for TP.  500ml of whole blood removed.  Pt drank soda during observation period.  Orthostatic VS are WNL.  Pt denies dizziness or feeling light headed.  PIV removed and site wrapped with pressure dressing.  Email sent to scheduling dept to set up future appts.  Pt is aware of appt with Dr. King on 11/22/2023.  Pt dc home in stable condition.

## 2023-11-15 NOTE — PROGRESS NOTES
"11/[id number]    Subjective    Chief Complaint:  Follow up from consultation for polycythemia    HPI:  58 female seen 8/21/23 with MARISA 2 negative, normal epo level polycythemia. She was scheduled for monthly therapeutic phlebotomies because of a Hgb of > 20.     ROS:    Constitutional: No weight loss  Skin: No rash or jaundice  HENT: No change in eyesight or hearing  Cardiovascular:No chest pain or arrythmia  Respiratory:No cough or SOB  GI:No nausea, vomiting, diarrhea, constipation  :No dysuria or frequency  Musculoskeletal:No bone or joint pain  Neuro:No sx's of neuropathy  Psych: No complaints    PMH:      No Known Allergies    History reviewed. No pertinent past medical history.     History reviewed. No pertinent surgical history.     Medications:    Current Outpatient Medications on File Prior to Encounter   Medication Sig Dispense Refill    prazosin (MINIPRESS) 1 MG Cap Take 1 mg by mouth at bedtime.      carBAMazepine (TEGRETOL) 200 MG Tab       cyclobenzaprine (FLEXERIL) 10 mg Tab TAKE ONE TABLET BY MOUTH THREE TIMES A DAY AS NEEDED FOR MUSCLE SPASMS 30 Tablet 1    escitalopram (LEXAPRO) 20 MG tablet Take 1 Tablet by mouth every day. 60 Tablet 1     No current facility-administered medications on file prior to encounter.       Social History     Tobacco Use    Smoking status: Light Smoker     Current packs/day: 0.50     Types: Cigarettes    Smokeless tobacco: Never   Substance Use Topics    Alcohol use: No        History reviewed. No pertinent family history.     Objective    Vitals:    /70 (BP Location: Right arm, Patient Position: Sitting, BP Cuff Size: Adult)   Pulse 78   Temp 36.5 °C (97.7 °F) (Temporal)   Resp 14   Ht 1.499 m (4' 11\")   Wt 75.1 kg (165 lb 9.1 oz)   SpO2 90%   BMI 33.44 kg/m²     Physical Exam:    Appears well-developed and well-nourished. No distress.    Head -  Normocephalic .   Eyes - Pupils are equal. Conjunctivae normal. No scleral icterus.   Ears - normal " hearing  Neurological -   Alert and oriented.  Skin -. No rash noted. Not diaphoretic. No erythema. No pallor. No jaundice   Psychiatric -  Normal mood and affect.    Labs:     Latest Reference Range & Units 08/30/23 15:40 09/28/23 16:48 10/26/23 15:30   Hemoglobin 12.0 - 16.0 g/dL 17.9 (H) 18.6 (H) 17.8 (H)   Hematocrit 37.0 - 47.0 % 54.0 (H) 54.6 (H) 53.2 (H)     Assessment  Stable  Imp:    Visit Diagnosis:    1. Polycythemia            Plan:  Spread out to q 2 months - see me in 6     Heri King M.D.

## 2023-11-22 ENCOUNTER — OUTPATIENT INFUSION SERVICES (OUTPATIENT)
Dept: ONCOLOGY | Facility: MEDICAL CENTER | Age: 58
End: 2023-11-22
Attending: INTERNAL MEDICINE
Payer: COMMERCIAL

## 2023-11-22 ENCOUNTER — HOSPITAL ENCOUNTER (OUTPATIENT)
Dept: HEMATOLOGY ONCOLOGY | Facility: MEDICAL CENTER | Age: 58
End: 2023-11-22
Attending: INTERNAL MEDICINE
Payer: COMMERCIAL

## 2023-11-22 VITALS
WEIGHT: 165.57 LBS | BODY MASS INDEX: 33.38 KG/M2 | HEART RATE: 78 BPM | TEMPERATURE: 97.7 F | SYSTOLIC BLOOD PRESSURE: 122 MMHG | DIASTOLIC BLOOD PRESSURE: 70 MMHG | RESPIRATION RATE: 14 BRPM | HEIGHT: 59 IN | OXYGEN SATURATION: 90 %

## 2023-11-22 VITALS
DIASTOLIC BLOOD PRESSURE: 71 MMHG | WEIGHT: 169.75 LBS | BODY MASS INDEX: 34.22 KG/M2 | SYSTOLIC BLOOD PRESSURE: 120 MMHG | HEIGHT: 59 IN | TEMPERATURE: 97.6 F | RESPIRATION RATE: 18 BRPM | HEART RATE: 87 BPM | OXYGEN SATURATION: 98 %

## 2023-11-22 DIAGNOSIS — D75.1 POLYCYTHEMIA: ICD-10-CM

## 2023-11-22 DIAGNOSIS — D75.1 ERYTHROCYTOSIS: ICD-10-CM

## 2023-11-22 LAB
HCT VFR BLD AUTO: 46.6 % (ref 37–47)
HGB BLD-MCNC: 15.7 G/DL (ref 12–16)

## 2023-11-22 PROCEDURE — 99195 PHLEBOTOMY: CPT

## 2023-11-22 PROCEDURE — 99212 OFFICE O/P EST SF 10 MIN: CPT | Performed by: INTERNAL MEDICINE

## 2023-11-22 PROCEDURE — 99213 OFFICE O/P EST LOW 20 MIN: CPT | Performed by: INTERNAL MEDICINE

## 2023-11-22 PROCEDURE — 85014 HEMATOCRIT: CPT

## 2023-11-22 PROCEDURE — 85018 HEMOGLOBIN: CPT

## 2023-11-22 RX ORDER — SODIUM CHLORIDE 9 MG/ML
500 INJECTION, SOLUTION INTRAVENOUS ONCE
Status: CANCELLED | OUTPATIENT
Start: 2023-11-22 | End: 2023-11-22

## 2023-11-22 ASSESSMENT — PAIN SCALES - GENERAL: PAINLEVEL: NO PAIN

## 2023-11-23 NOTE — PROGRESS NOTES
Pt arrives for therapeutic phlebotomy. Labs drawn as ordered by MD. Pt's Hgb = 15.7 , Hct = 46.6 %. Teaching and education reinforcement and emotional support provided. 1 unit of whole blood removed (500 cc). Pt tolerated well. Orthostatic vitals stable, see flow sheet. Pt denies chest pain, shortness of breath, dizziness, or lightheadedness after treatment. Pt DC'd home to self care in good condition and in NAD. Appointment confirm for next treatment.

## 2023-12-21 ENCOUNTER — APPOINTMENT (OUTPATIENT)
Dept: ONCOLOGY | Facility: MEDICAL CENTER | Age: 58
End: 2023-12-21
Attending: INTERNAL MEDICINE
Payer: COMMERCIAL

## 2024-01-22 ENCOUNTER — OUTPATIENT INFUSION SERVICES (OUTPATIENT)
Dept: ONCOLOGY | Facility: MEDICAL CENTER | Age: 59
End: 2024-01-22
Attending: INTERNAL MEDICINE
Payer: COMMERCIAL

## 2024-01-22 VITALS
HEART RATE: 83 BPM | DIASTOLIC BLOOD PRESSURE: 75 MMHG | HEIGHT: 59 IN | RESPIRATION RATE: 18 BRPM | SYSTOLIC BLOOD PRESSURE: 118 MMHG | WEIGHT: 163.14 LBS | BODY MASS INDEX: 32.89 KG/M2 | TEMPERATURE: 97 F | OXYGEN SATURATION: 90 %

## 2024-01-22 DIAGNOSIS — D75.1 ERYTHROCYTOSIS: ICD-10-CM

## 2024-01-22 LAB
HCT VFR BLD AUTO: 48.6 % (ref 37–47)
HGB BLD-MCNC: 16.2 G/DL (ref 12–16)

## 2024-01-22 PROCEDURE — 85014 HEMATOCRIT: CPT

## 2024-01-22 PROCEDURE — 85018 HEMOGLOBIN: CPT

## 2024-01-22 PROCEDURE — 99195 PHLEBOTOMY: CPT

## 2024-01-22 RX ORDER — SODIUM CHLORIDE 9 MG/ML
500 INJECTION, SOLUTION INTRAVENOUS ONCE
Status: CANCELLED | OUTPATIENT
Start: 2024-01-22 | End: 2024-01-22

## 2024-01-23 NOTE — PROGRESS NOTES
Patient came into INF independently. Orders and vitals reviewed, assessment done. PIV started with blood return noted. Labs collected from PIV and reviewed. Per order parameters patient requires therapeutic phlebotomy. 500cc of whole blood drawn off PIV. 15 minuets post vital taken sitting and standing. PIV removed with tip intact. Patient left in stable condition with next appointment confirmed.

## 2024-03-24 ENCOUNTER — OUTPATIENT INFUSION SERVICES (OUTPATIENT)
Dept: ONCOLOGY | Facility: MEDICAL CENTER | Age: 59
End: 2024-03-24
Attending: INTERNAL MEDICINE
Payer: COMMERCIAL

## 2024-03-24 VITALS
RESPIRATION RATE: 18 BRPM | BODY MASS INDEX: 32.44 KG/M2 | WEIGHT: 160.94 LBS | SYSTOLIC BLOOD PRESSURE: 130 MMHG | OXYGEN SATURATION: 88 % | TEMPERATURE: 97.4 F | HEART RATE: 82 BPM | HEIGHT: 59 IN | DIASTOLIC BLOOD PRESSURE: 72 MMHG

## 2024-03-24 DIAGNOSIS — D75.1 ERYTHROCYTOSIS: ICD-10-CM

## 2024-03-24 LAB
HCT VFR BLD AUTO: 48.4 % (ref 37–47)
HGB BLD-MCNC: 15.6 G/DL (ref 12–16)

## 2024-03-24 PROCEDURE — 85014 HEMATOCRIT: CPT

## 2024-03-24 PROCEDURE — 99195 PHLEBOTOMY: CPT

## 2024-03-24 PROCEDURE — 85018 HEMOGLOBIN: CPT

## 2024-03-24 RX ORDER — SODIUM CHLORIDE 9 MG/ML
500 INJECTION, SOLUTION INTRAVENOUS ONCE
OUTPATIENT
Start: 2024-03-24 | End: 2024-03-24

## 2024-03-25 NOTE — PROGRESS NOTES
Pt arrived ambulatory for TP, denies c/o, states she is tolerating phlebotomies well, no new issues.  Pt ate lunch prior to arrival, denied need for any further food, gave water while here. PIV started in LAC with good blood return, labs drawn. Results reviewed, WNL and okay for TP.  500cc blood removed over 10 minutes, pt tolerated well, no issues noted.  Pt observed for 15 minutes after, VS checked sitting and standing, okay for DC.  Pt Dc'd home and will f/u as scheduled.

## 2024-05-19 ENCOUNTER — TELEPHONE (OUTPATIENT)
Dept: ONCOLOGY | Facility: MEDICAL CENTER | Age: 59
End: 2024-05-19

## 2024-05-19 ENCOUNTER — APPOINTMENT (OUTPATIENT)
Dept: ONCOLOGY | Facility: MEDICAL CENTER | Age: 59
End: 2024-05-19
Attending: INTERNAL MEDICINE
Payer: COMMERCIAL

## 2024-05-19 NOTE — TELEPHONE ENCOUNTER
"Called Pt regarding 1600 TP appointment. Pt reports she is \"not feeling well today, tried to call and cancel appointment but no answer.\" Pt requesting to reschedule this week for an afternoon appointment if possible, will message scheduling.    "

## 2024-05-22 NOTE — PROGRESS NOTES
"06/03/24    Subjective    Chief Complaint:  Follow up secondary polycythemia    HPI:  58 female with normal epo, negative MARISA 2 mutation polycythemia. She has been on q 2 month therapeutic phlebotomies. Most recent phlebotomy 5/27/24. Complains of fatigue. Goes to [employer]. No recent thyroid or B12 lab in the computer. Is on antidepressants.       ROS:    Constitutional: No weight loss  Skin: No rash or jaundice  HENT: No change in eyesight or hearing  Cardiovascular:No chest pain or arrythmia  Respiratory:No cough or SOB  GI:No nausea, vomiting, diarrhea, constipation  :No dysuria or frequency  Musculoskeletal:No bone or joint pain  Neuro:No sx's of neuropathy  Psych: No complaints    PMH:      No Known Allergies    History reviewed. No pertinent past medical history.     History reviewed. No pertinent surgical history.     Medications:    Current Outpatient Medications on File Prior to Encounter   Medication Sig Dispense Refill    prazosin (MINIPRESS) 1 MG Cap Take 1 mg by mouth at bedtime.      carBAMazepine (TEGRETOL) 200 MG Tab       cyclobenzaprine (FLEXERIL) 10 mg Tab TAKE ONE TABLET BY MOUTH THREE TIMES A DAY AS NEEDED FOR MUSCLE SPASMS 30 Tablet 1    escitalopram (LEXAPRO) 20 MG tablet Take 1 Tablet by mouth every day. 60 Tablet 1     No current facility-administered medications on file prior to encounter.       Social History     Tobacco Use    Smoking status: Light Smoker     Current packs/day: 0.50     Types: Cigarettes    Smokeless tobacco: Never   Substance Use Topics    Alcohol use: No        History reviewed. No pertinent family history.     Objective    Vitals:    /76 (BP Location: Left arm, Patient Position: Sitting, BP Cuff Size: Adult)   Pulse 88   Temp 36.2 °C (97.2 °F) (Temporal)   Resp 12   Ht 1.499 m (4' 11\")   Wt 71.5 kg (157 lb 10.1 oz)   SpO2 88%   BMI 31.84 kg/m²     Physical Exam:    Appears well-developed and well-nourished. No distress.    Head -  Normocephalic .   Eyes - " Pupils are equal. Conjunctivae normal. No scleral icterus.   Ears - normal hearing  Neurological -   Alert and oriented.  Skin - Skin is warm and dry. No rash noted. Not diaphoretic. No erythema. No pallor. No jaundice   Psychiatric -  Normal mood and affect.    Labs:     Latest Reference Range & Units 11/22/23 15:10 01/22/24 15:00 03/24/24 16:40 05/27/24 15:51   Hemoglobin 12.0 - 16.0 g/dL 15.7 16.2 (H) 15.6 16.1 (H)   Hematocrit 37.0 - 47.0 % 46.6 48.6 (H) 48.4 (H) 50.8 (H)     Assessment    Imp:    Visit Diagnosis:    1. Polycythemia            Plan:  Continue q 2 month phlebotomies  Discuss with primary  - sx and meds  See Estefanía in 6 months    Heri King M.D.

## 2024-05-27 ENCOUNTER — OUTPATIENT INFUSION SERVICES (OUTPATIENT)
Dept: ONCOLOGY | Facility: MEDICAL CENTER | Age: 59
End: 2024-05-27
Attending: INTERNAL MEDICINE
Payer: COMMERCIAL

## 2024-05-27 VITALS
OXYGEN SATURATION: 90 % | RESPIRATION RATE: 18 BRPM | BODY MASS INDEX: 32.09 KG/M2 | HEIGHT: 59 IN | SYSTOLIC BLOOD PRESSURE: 120 MMHG | HEART RATE: 82 BPM | TEMPERATURE: 97 F | DIASTOLIC BLOOD PRESSURE: 72 MMHG | WEIGHT: 159.17 LBS

## 2024-05-27 DIAGNOSIS — D75.1 ERYTHROCYTOSIS: ICD-10-CM

## 2024-05-27 LAB
HCT VFR BLD AUTO: 50.8 % (ref 37–47)
HGB BLD-MCNC: 16.1 G/DL (ref 12–16)

## 2024-05-27 RX ORDER — SODIUM CHLORIDE 9 MG/ML
500 INJECTION, SOLUTION INTRAVENOUS ONCE
Status: COMPLETED | OUTPATIENT
Start: 2024-05-27 | End: 2024-05-27

## 2024-05-27 RX ORDER — SODIUM CHLORIDE 9 MG/ML
500 INJECTION, SOLUTION INTRAVENOUS ONCE
OUTPATIENT
Start: 2024-05-27 | End: 2024-05-27

## 2024-05-27 RX ADMIN — SODIUM CHLORIDE 500 ML: 9 INJECTION, SOLUTION INTRAVENOUS at 16:39

## 2024-05-28 NOTE — PROGRESS NOTES
Bernarda arrives for the therapeutic phlebotomy. Labs drawn as ordered by MD.  Bernarda's Hgb 16.1 , Hct 50.8 %. 500 cc blood removed. Bernarda reports feeling unwell. Stated that she didn't eat a lot today and had a lot of tea. 500 ml NS given per MAR. Provided juice and turkey sandwich to Bernarda. After hydration, Orthostatic vitals stable, see flowsheet. Bernarda denies chest pain, shortness of breath, dizziness, or lightheadedness after treatment. Next appointment scheduled. Discharged to self care.

## 2024-06-03 ENCOUNTER — HOSPITAL ENCOUNTER (OUTPATIENT)
Dept: HEMATOLOGY ONCOLOGY | Facility: MEDICAL CENTER | Age: 59
End: 2024-06-03
Attending: INTERNAL MEDICINE
Payer: COMMERCIAL

## 2024-06-03 VITALS
TEMPERATURE: 97.2 F | OXYGEN SATURATION: 88 % | HEIGHT: 59 IN | WEIGHT: 157.63 LBS | RESPIRATION RATE: 12 BRPM | SYSTOLIC BLOOD PRESSURE: 120 MMHG | DIASTOLIC BLOOD PRESSURE: 76 MMHG | BODY MASS INDEX: 31.78 KG/M2 | HEART RATE: 88 BPM

## 2024-06-03 DIAGNOSIS — D75.1 POLYCYTHEMIA: ICD-10-CM

## 2024-06-03 PROCEDURE — 99212 OFFICE O/P EST SF 10 MIN: CPT | Performed by: INTERNAL MEDICINE

## 2024-06-03 PROCEDURE — 99213 OFFICE O/P EST LOW 20 MIN: CPT | Performed by: INTERNAL MEDICINE

## 2024-06-03 ASSESSMENT — PAIN SCALES - GENERAL: PAINLEVEL: NO PAIN

## 2024-07-22 ENCOUNTER — OUTPATIENT INFUSION SERVICES (OUTPATIENT)
Dept: ONCOLOGY | Facility: MEDICAL CENTER | Age: 59
End: 2024-07-22
Attending: INTERNAL MEDICINE
Payer: COMMERCIAL

## 2024-07-22 VITALS
DIASTOLIC BLOOD PRESSURE: 77 MMHG | HEART RATE: 89 BPM | RESPIRATION RATE: 18 BRPM | SYSTOLIC BLOOD PRESSURE: 111 MMHG | HEIGHT: 59 IN | WEIGHT: 160.94 LBS | BODY MASS INDEX: 32.44 KG/M2 | OXYGEN SATURATION: 92 % | TEMPERATURE: 97.7 F

## 2024-07-22 DIAGNOSIS — D75.1 ERYTHROCYTOSIS: ICD-10-CM

## 2024-07-22 LAB
HCT VFR BLD AUTO: 51.1 % (ref 37–47)
HGB BLD-MCNC: 16.8 G/DL (ref 12–16)

## 2024-07-22 PROCEDURE — 85018 HEMOGLOBIN: CPT

## 2024-07-22 PROCEDURE — 85014 HEMATOCRIT: CPT

## 2024-07-22 PROCEDURE — 99195 PHLEBOTOMY: CPT

## 2024-07-22 RX ORDER — LAMOTRIGINE 25 MG/1
25 TABLET ORAL DAILY
COMMUNITY
Start: 2024-07-01

## 2024-07-22 RX ORDER — SODIUM CHLORIDE 9 MG/ML
500 INJECTION, SOLUTION INTRAVENOUS ONCE
OUTPATIENT
Start: 2024-07-22 | End: 2024-07-22

## 2024-08-15 ENCOUNTER — OFFICE VISIT (OUTPATIENT)
Dept: URGENT CARE | Facility: CLINIC | Age: 59
End: 2024-08-15
Payer: COMMERCIAL

## 2024-08-15 VITALS
BODY MASS INDEX: 32.2 KG/M2 | DIASTOLIC BLOOD PRESSURE: 62 MMHG | SYSTOLIC BLOOD PRESSURE: 118 MMHG | OXYGEN SATURATION: 90 % | WEIGHT: 164 LBS | RESPIRATION RATE: 18 BRPM | HEART RATE: 72 BPM | HEIGHT: 60 IN | TEMPERATURE: 99.1 F

## 2024-08-15 DIAGNOSIS — J01.00 ACUTE NON-RECURRENT MAXILLARY SINUSITIS: ICD-10-CM

## 2024-08-15 PROCEDURE — 3074F SYST BP LT 130 MM HG: CPT

## 2024-08-15 PROCEDURE — 3078F DIAST BP <80 MM HG: CPT

## 2024-08-15 PROCEDURE — 99203 OFFICE O/P NEW LOW 30 MIN: CPT

## 2024-08-15 ASSESSMENT — ENCOUNTER SYMPTOMS
COUGH: 1
FEVER: 0
SHORTNESS OF BREATH: 0
SORE THROAT: 0
NAUSEA: 0
ABDOMINAL PAIN: 0
DIZZINESS: 1
HEADACHES: 0
SINUS PRESSURE: 1
DIARRHEA: 0
SINUS PAIN: 1
VOMITING: 0
CHILLS: 0
MYALGIAS: 0

## 2024-08-16 NOTE — PROGRESS NOTES
"Subjective:   Bernarda Raymond is a 58 y.o. female who presents for Sinus Problem (X last week, today: coughing up blood, Rt ear feels full/unable to hear from it, not improving, sinus pain/soreness, had fever )      Sinus Problem  This is a new problem. The current episode started 1 to 4 weeks ago (x2 weeks). The problem has been gradually worsening since onset. Associated symptoms include congestion, coughing, ear pain (R > L) and sinus pressure. Pertinent negatives include no chills, headaches, shortness of breath or sore throat. Treatments tried: Over-the-counter cough/decongestant. The treatment provided no relief.       Review of Systems   Constitutional:  Negative for chills, fever and malaise/fatigue.   HENT:  Positive for congestion, ear pain (R > L), sinus pressure and sinus pain. Negative for hearing loss and sore throat.    Respiratory:  Positive for cough. Negative for shortness of breath.    Cardiovascular:  Negative for chest pain.   Gastrointestinal:  Negative for abdominal pain, diarrhea, nausea and vomiting.   Genitourinary:  Negative for dysuria.   Musculoskeletal:  Negative for myalgias.   Skin:  Negative for rash.   Neurological:  Positive for dizziness. Negative for headaches.       Medications, Allergies, and current problem list reviewed today in Epic.     Objective:     /62 (BP Location: Left arm, Patient Position: Sitting, BP Cuff Size: Adult)   Pulse 72   Temp 37.3 °C (99.1 °F) (Temporal)   Resp 18   Ht 1.511 m (4' 11.5\")   Wt 74.4 kg (164 lb)   SpO2 90%     Physical Exam  Vitals and nursing note reviewed.   Constitutional:       General: She is not in acute distress.     Appearance: Normal appearance. She is not ill-appearing.   HENT:      Head: Normocephalic and atraumatic.      Right Ear: Tympanic membrane is erythematous. Tympanic membrane is not perforated or bulging.      Left Ear: Tympanic membrane is erythematous. Tympanic membrane is not perforated or bulging.      " Nose: Congestion present.      Right Turbinates: Enlarged.      Left Turbinates: Enlarged.      Right Sinus: Maxillary sinus tenderness present.      Left Sinus: Maxillary sinus tenderness present.      Mouth/Throat:      Mouth: Mucous membranes are moist.      Pharynx: Posterior oropharyngeal erythema present. No oropharyngeal exudate.   Eyes:      Conjunctiva/sclera: Conjunctivae normal.   Cardiovascular:      Rate and Rhythm: Normal rate.      Heart sounds: Normal heart sounds.   Pulmonary:      Effort: Pulmonary effort is normal. No respiratory distress.      Breath sounds: Normal breath sounds. No wheezing.   Abdominal:      General: Abdomen is flat.      Palpations: Abdomen is soft.   Musculoskeletal:         General: Normal range of motion.      Cervical back: Normal range of motion. No rigidity or tenderness.   Lymphadenopathy:      Cervical: No cervical adenopathy.   Skin:     General: Skin is warm and dry.      Capillary Refill: Capillary refill takes less than 2 seconds.   Neurological:      Mental Status: She is alert and oriented to person, place, and time.   Psychiatric:         Mood and Affect: Mood normal.         Behavior: Behavior normal.         Assessment/Plan:       1. Acute non-recurrent maxillary sinusitis  amoxicillin-clavulanate (AUGMENTIN) 875-125 MG Tab        After assessment patient does appear to have acute maxillary sinusitis.  Patient reports coughing up blood which does appear that it could be possible postnasal drip. Patient will be provided Augmentin at this time.  Patient instructed take as prescribed.  Patient instructed to monitor for any worsening signs and symptoms and if no improvement on antibiotics after 2 or 3 days or any other concerns to return to urgent care or emergency department for further management.    Take full course of antibiotic  Tylenol and Motrin for fever and pain  OTC meds as discussed including oral decongestant if tolerated  Increase fluids and  rest  Nasal spray, nasal rinse/wash, juarez pot    Differential diagnosis, natural history, and supportive care discussed. We also reviewed side effects of medication including allergic response, GI upset, tendon injury, rash, sedation etc. Patient and/or guardian voices understanding.      Advised the patient to follow-up with the primary care physician for recheck, reevaluation, and consideration of further management.    I personally reviewed prior external notes and test results pertinent to today's visit as well as additional imaging and testing completed in clinic today.     Please note that this dictation was created using voice recognition software. I have made every reasonable attempt to correct obvious errors, but I expect that there are errors of grammar and possibly content that I did not discover before finalizing the note.    This note was electronically signed by MATTHEW Clay

## 2024-09-18 ENCOUNTER — OUTPATIENT INFUSION SERVICES (OUTPATIENT)
Dept: ONCOLOGY | Facility: MEDICAL CENTER | Age: 59
End: 2024-09-18
Attending: NURSE PRACTITIONER
Payer: COMMERCIAL

## 2024-09-18 VITALS
TEMPERATURE: 96.8 F | OXYGEN SATURATION: 90 % | WEIGHT: 154.98 LBS | SYSTOLIC BLOOD PRESSURE: 110 MMHG | RESPIRATION RATE: 18 BRPM | HEART RATE: 90 BPM | DIASTOLIC BLOOD PRESSURE: 70 MMHG | HEIGHT: 59 IN | BODY MASS INDEX: 31.24 KG/M2

## 2024-09-18 DIAGNOSIS — D75.1 ERYTHROCYTOSIS: ICD-10-CM

## 2024-09-18 LAB
HCT VFR BLD AUTO: 54.9 % (ref 37–47)
HGB BLD-MCNC: 17.7 G/DL (ref 12–16)

## 2024-09-18 PROCEDURE — 85018 HEMOGLOBIN: CPT

## 2024-09-18 PROCEDURE — 99195 PHLEBOTOMY: CPT

## 2024-09-18 PROCEDURE — 85014 HEMATOCRIT: CPT

## 2024-09-18 RX ORDER — SODIUM CHLORIDE 9 MG/ML
500 INJECTION, SOLUTION INTRAVENOUS ONCE
OUTPATIENT
Start: 2024-09-18 | End: 2024-09-18

## 2024-09-19 NOTE — PROGRESS NOTES
Bernarda arrives for the therapeutic phlebotomy. Labs drawn as ordered by MD, through established PIV in L AC x1 attempt.  Hgb 17.7 , Hct 54.9 %. Bernarda meets parameters for treatment. 500 cc blood removed. Patient tolerated well. Orthostatic vitals stable, see flowsheet. Bernarda denies chest pain, shortness of breath, dizziness, or lightheadedness after treatment. Bernarda DC'd home to self care in good  condition. Future appointments discussed with patient.

## 2024-11-13 ENCOUNTER — OUTPATIENT INFUSION SERVICES (OUTPATIENT)
Dept: ONCOLOGY | Facility: MEDICAL CENTER | Age: 59
End: 2024-11-13
Attending: NURSE PRACTITIONER
Payer: COMMERCIAL

## 2024-11-13 VITALS
HEART RATE: 96 BPM | BODY MASS INDEX: 30.53 KG/M2 | SYSTOLIC BLOOD PRESSURE: 114 MMHG | OXYGEN SATURATION: 89 % | RESPIRATION RATE: 18 BRPM | WEIGHT: 151.46 LBS | HEIGHT: 59 IN | TEMPERATURE: 97 F | DIASTOLIC BLOOD PRESSURE: 76 MMHG

## 2024-11-13 DIAGNOSIS — D75.1 ERYTHROCYTOSIS: ICD-10-CM

## 2024-11-13 LAB
HCT VFR BLD AUTO: 57.9 % (ref 37–47)
HGB BLD-MCNC: 18.9 G/DL (ref 12–16)

## 2024-11-13 PROCEDURE — 85014 HEMATOCRIT: CPT

## 2024-11-13 PROCEDURE — 99195 PHLEBOTOMY: CPT

## 2024-11-13 PROCEDURE — 85018 HEMOGLOBIN: CPT

## 2024-11-13 RX ORDER — SODIUM CHLORIDE 9 MG/ML
500 INJECTION, SOLUTION INTRAVENOUS ONCE
OUTPATIENT
Start: 2024-11-13 | End: 2024-11-13

## 2024-11-14 NOTE — PROGRESS NOTES
[facility] for [facility] and possible TP. Pt med rec updated. POC reviewed.    IV started in R upper FA, brisk blood return noted, labs drawn off line, Pt tolerated well.     Lab results reviewed, Hct 18.9, Hct 57.9, within parameters for TP.     500 ml blood phlebotomized over 21 minutes, Bernarda tolerated well.     Pt monitored for 15 minutes post TP, orthostatic VSS, Pt denied dizziness, lightheadedness, CP or SOB. IV removed, tip intact/gauze and coban applied.    Pt reports next appointment pending POC with provider and Bernarda was discharged home in no acute distress.

## 2024-12-13 ENCOUNTER — APPOINTMENT (OUTPATIENT)
Dept: HEMATOLOGY ONCOLOGY | Facility: MEDICAL CENTER | Age: 59
End: 2024-12-13
Attending: INTERNAL MEDICINE
Payer: COMMERCIAL

## 2024-12-17 ENCOUNTER — HOSPITAL ENCOUNTER (OUTPATIENT)
Dept: HEMATOLOGY ONCOLOGY | Facility: MEDICAL CENTER | Age: 59
End: 2024-12-17
Attending: INTERNAL MEDICINE
Payer: COMMERCIAL

## 2024-12-17 ENCOUNTER — HOSPITAL ENCOUNTER (OUTPATIENT)
Dept: LAB | Facility: MEDICAL CENTER | Age: 59
End: 2024-12-17
Attending: INTERNAL MEDICINE
Payer: COMMERCIAL

## 2024-12-17 VITALS
OXYGEN SATURATION: 92 % | HEART RATE: 88 BPM | SYSTOLIC BLOOD PRESSURE: 118 MMHG | BODY MASS INDEX: 31.93 KG/M2 | WEIGHT: 152.12 LBS | TEMPERATURE: 97.2 F | HEIGHT: 58 IN | DIASTOLIC BLOOD PRESSURE: 64 MMHG | RESPIRATION RATE: 17 BRPM

## 2024-12-17 DIAGNOSIS — D75.1 POLYCYTHEMIA: ICD-10-CM

## 2024-12-17 DIAGNOSIS — D75.1 ERYTHROCYTOSIS: ICD-10-CM

## 2024-12-17 LAB
ALBUMIN SERPL BCP-MCNC: 4.1 G/DL (ref 3.2–4.9)
ALBUMIN/GLOB SERPL: 1.3 G/DL
ALP SERPL-CCNC: 110 U/L (ref 30–99)
ALT SERPL-CCNC: 14 U/L (ref 2–50)
ANION GAP SERPL CALC-SCNC: 10 MMOL/L (ref 7–16)
AST SERPL-CCNC: 22 U/L (ref 12–45)
BILIRUB SERPL-MCNC: 0.2 MG/DL (ref 0.1–1.5)
BUN SERPL-MCNC: 8 MG/DL (ref 8–22)
CALCIUM ALBUM COR SERPL-MCNC: 9.3 MG/DL (ref 8.5–10.5)
CALCIUM SERPL-MCNC: 9.4 MG/DL (ref 8.5–10.5)
CHLORIDE SERPL-SCNC: 104 MMOL/L (ref 96–112)
CO2 SERPL-SCNC: 25 MMOL/L (ref 20–33)
CREAT SERPL-MCNC: 0.76 MG/DL (ref 0.5–1.4)
FERRITIN SERPL-MCNC: 17.7 NG/ML (ref 10–291)
GFR SERPLBLD CREATININE-BSD FMLA CKD-EPI: 91 ML/MIN/1.73 M 2
GLOBULIN SER CALC-MCNC: 3.1 G/DL (ref 1.9–3.5)
GLUCOSE SERPL-MCNC: 105 MG/DL (ref 65–99)
POTASSIUM SERPL-SCNC: 4.1 MMOL/L (ref 3.6–5.5)
PROT SERPL-MCNC: 7.2 G/DL (ref 6–8.2)
SODIUM SERPL-SCNC: 139 MMOL/L (ref 135–145)

## 2024-12-17 PROCEDURE — 80053 COMPREHEN METABOLIC PANEL: CPT

## 2024-12-17 PROCEDURE — 81219 CALR GENE COM VARIANTS: CPT

## 2024-12-17 PROCEDURE — 82668 ASSAY OF ERYTHROPOIETIN: CPT

## 2024-12-17 PROCEDURE — 81270 JAK2 GENE: CPT

## 2024-12-17 PROCEDURE — 81338 MPL GENE COMMON VARIANTS: CPT

## 2024-12-17 PROCEDURE — 82728 ASSAY OF FERRITIN: CPT

## 2024-12-17 PROCEDURE — 99215 OFFICE O/P EST HI 40 MIN: CPT | Performed by: INTERNAL MEDICINE

## 2024-12-17 PROCEDURE — 36415 COLL VENOUS BLD VENIPUNCTURE: CPT

## 2024-12-17 PROCEDURE — 99212 OFFICE O/P EST SF 10 MIN: CPT | Performed by: INTERNAL MEDICINE

## 2024-12-17 ASSESSMENT — PAIN SCALES - GENERAL: PAINLEVEL_OUTOF10: NO PAIN

## 2024-12-17 NOTE — PROGRESS NOTES
PROGRESS NOTE: HEMATOLOGY/ONCOLOGY         Chief Complaint:  Follow up for polycythemia    HPI:      Bernarda Raymond is a pleasant 59 year old white female who has a history of polycythemia.she presented with a hemoglobin level of 20.4 and a hematocrit level of 56.9 on August 21, 2023. She had a MARISA 2 mutation test by primary care physician which was reportedly negative.her erythropoietin level was 18.9 which is elevated.polycythemia vera was ruled out. She has been on q 2 month therapeutic phlebotomies. Most recent phlebotomy 11/13/24.  She comes for routine follow-up today.      ROS:    Constitutional: No weight loss  Skin: No rash or jaundice  HENT: No change in eyesight or hearing  Cardiovascular:No chest pain or arrythmia  Respiratory:No cough or SOB  GI:No nausea, vomiting, diarrhea, constipation  :No dysuria or frequency  Musculoskeletal:No bone or joint pain  Neuro:No sx's of neuropathy  Psych: No complaints    PMH:      No Known Allergies    History reviewed. No pertinent past medical history.     History reviewed. No pertinent surgical history.     Medications:    Current Outpatient Medications on File Prior to Encounter   Medication Sig Dispense Refill    NON SPECIFIED [employer] Brand-Decongestant(Suspension)      lamoTRIgine (LAMICTAL) 25 MG Tab Take 25 mg by mouth every day.      prazosin (MINIPRESS) 1 MG Cap Take 1 mg by mouth at bedtime.      carBAMazepine (TEGRETOL) 200 MG Tab  (Patient not taking: Reported on 9/18/2024)      cyclobenzaprine (FLEXERIL) 10 mg Tab TAKE ONE TABLET BY MOUTH THREE TIMES A DAY AS NEEDED FOR MUSCLE SPASMS 30 Tablet 1    escitalopram (LEXAPRO) 20 MG tablet Take 1 Tablet by mouth every day. 60 Tablet 1     No current facility-administered medications on file prior to encounter.       Social History     Tobacco Use    Smoking status: Former     Current packs/day: 0.50     Types: Cigarettes    Smokeless tobacco: Never   Substance Use Topics    Alcohol use: No        History  "reviewed. No pertinent family history.     Objective    Vitals:    /64 (BP Location: Left arm, Patient Position: Sitting, BP Cuff Size: Adult)   Pulse 88   Temp 36.2 °C (97.2 °F) (Temporal)   Resp 17   Ht 1.473 m (4' 10\")   Wt 69 kg (152 lb 1.9 oz)   SpO2 92%   BMI 31.79 kg/m²     Physical Exam:    Appears well-developed and well-nourished. No distress.    Head -  Normocephalic .   Eyes - Pupils are equal. Conjunctivae normal. No scleral icterus.   Ears - normal hearing  Neurological -   Alert and oriented.  Skin - Skin is warm and dry. No rash noted. Not diaphoretic. No erythema. No pallor. No jaundice   Psychiatric -  Normal mood and affect.        Assessment and plan    Polycythemia of unknown cause  I spent a significant amount of time with the patient regarding further management and care.  She presented with a hemoglobin level of 20.4 and a hematocrit level of 56.9 on August 21, 2023.  MARISA 2 mutation was negative and her erythropoietin level is high and a polycythemia vera was ruled out at the time.  She was diagnosed with secondary polycythemia and she is currently on every 2-month routine phlebotomy.     With a detailed history, there is no secondary cause to cause polycythemia in her case.  I spent significant amount of time with the patient regarding further management and care.  I will do work up again.  I will go ahead with a JAK2 mutation test along with the erythropoietin level today and I will follow her in 2 weeks with virtual visit.    If her repeat test is negative, I will recommend a Bone Marrow examination to rule out polycythemia vera.    Her presentation is compatible with polycythemia vera and I recommended she takes baby aspirin 81 mg p.o. daily from today.    Thank you for allowing me to participate in your patient care.  I spent 39 minutes today reviewing her medical record and her care.      Nawaf Keating M.D.        "

## 2024-12-18 LAB — EPO SERPL-ACNC: 26 MU/ML (ref 4–27)

## 2024-12-26 LAB
JAK2 P.V617F BLD/T QL: NOT DETECTED
SPECIMEN SOURCE: NORMAL

## 2024-12-30 ENCOUNTER — HOSPITAL ENCOUNTER (OUTPATIENT)
Dept: HEMATOLOGY ONCOLOGY | Facility: MEDICAL CENTER | Age: 59
End: 2024-12-30
Attending: INTERNAL MEDICINE
Payer: COMMERCIAL

## 2024-12-30 DIAGNOSIS — D75.1 POLYCYTHEMIA: ICD-10-CM

## 2024-12-30 LAB
GENE XXX MUT ANL BLD/T: NOT DETECTED
MPL P.W515 BLD/T QL: NOT DETECTED
SPECIMEN SOURCE: NORMAL
SPECIMEN SOURCE: NORMAL

## 2024-12-30 NOTE — PROGRESS NOTES
PROGRESS NOTE: HEMATOLOGY/ONCOLOGY   This evaluation was conducted via Teams using secure and encrypted videoconferencing technology. The patient was in their home in the [facility].    The patient's identity was confirmed and verbal consent was obtained for this virtual visit.         Chief Complaint:  Follow up for polycythemia      HPI:    Bernarda Raymond is a pleasant 59 year old white female who has a history of polycythemia. She presented with a hemoglobin level of 20.4 and a hematocrit level of 56.9 on August 21, 2023. She had a MARISA 2 mutation test by primary care physician which was reportedly negative. Her erythropoietin level was 18.9 which is elevated. Polycythemia vera was ruled out. She has been on q 2 month therapeutic phlebotomies at my office. Most recent phlebotomy 11/13/24.     I did a workup again which showed JAK2 mutation testing negative and the erythropoietin level of 26 on December 17, 2024.  I will discontinue phlebotomy in my office and I recommended that she donate blood 1 unit every 2 to 3 months at the blood bank. She comes for routine follow-up today.      ROS:    Constitutional: No weight loss  Skin: No rash or jaundice  HENT: No change in eyesight or hearing  Cardiovascular:No chest pain or arrythmia  Respiratory:No cough or SOB  GI:No nausea, vomiting, diarrhea, constipation  :No dysuria or frequency  Musculoskeletal:No bone or joint pain  Neuro:No sx's of neuropathy  Psych: No complaints    PMH:      No Known Allergies    History reviewed. No pertinent past medical history.     History reviewed. No pertinent surgical history.     Medications:    Current Outpatient Medications on File Prior to Encounter   Medication Sig Dispense Refill    lamoTRIgine (LAMICTAL) 25 MG Tab Take 25 mg by mouth every day.      prazosin (MINIPRESS) 1 MG Cap Take 1 mg by mouth at bedtime.      cyclobenzaprine (FLEXERIL) 10 mg Tab TAKE ONE TABLET BY MOUTH THREE TIMES A DAY AS NEEDED FOR MUSCLE  SPASMS 30 Tablet 1    escitalopram (LEXAPRO) 20 MG tablet Take 1 Tablet by mouth every day. 60 Tablet 1    NON SPECIFIED [employer] Brand-Decongestant(Suspension)      carBAMazepine (TEGRETOL) 200 MG Tab  (Patient not taking: Reported on 9/18/2024)       No current facility-administered medications on file prior to encounter.       Social History     Tobacco Use    Smoking status: Former     Current packs/day: 0.50     Types: Cigarettes    Smokeless tobacco: Never   Substance Use Topics    Alcohol use: No        History reviewed. No pertinent family history.     Objective    Vitals:    There were no vitals taken for this visit.    Physical Exam:(Virtual)    Appears well-developed and well-nourished. No distress.    Head -  Normocephalic .   Eyes - Pupils are equal. Conjunctivae normal. No scleral icterus.   Ears - normal hearing  Neurological -   Alert and oriented.  Skin - Skin is warm and dry. No rash noted. Not diaphoretic. No erythema. No pallor. No jaundice   Psychiatric -  Normal mood and affect.        Assessment and plan    Polycythemia of unknown cause  I spent a significant amount of time with the patient regarding further management and care.    She presented with a hemoglobin level of 20.4 and a hematocrit level of 56.9 on August 21, 2023.    MARISA 2 mutation was negative and her erythropoietin level is high and a polycythemia vera was ruled out at the time.    Since she has a continuously elevated hemoglobin level, I repeated workup which showed JAK2 mutation negative and the erythropoietin level over 26 on December 17, 2024.    Polycythemia vera was ruled out .  She is currently on every 2-month routine phlebotomy which I will discontinue.  She is on baby aspirin 81 mg p.o. daily which I recommended she take it continuously.    I explained to her that her hemoglobin level is still too high even though she does not have a polycythemia vera.    Since workup is repeatedly negative, I would not do a bone marrow  examination.    I recommended she donate 1 unit of blood at the blood bank every 2 to 3 months to decrease her hemoglobin level.    I will follow her in 6 months with a follow-up CBC, CMP, and a ferritin level for continuous care.  I will consider discharging her if her hemoglobin level is acceptable at the time      Thank you for allowing me to participate in your patient care.  I spent 28 minutes today reviewing her medical record and her care.      Nawaf Keating M.D.

## 2025-06-26 NOTE — PROGRESS NOTES
"Follow Up Note:  Hematology/Oncology    Current Diagnosis : polycythemia of unknown cause  Date of Diagnosis: 2023    Chief Complaint: Patient seen today for evaluation and ongoing management of polycythemia.     Care Team:   Saba Irving M.D.    Oncology History of Presenting Illness:   Per Dr Keating \"Bernarda Raymond is a pleasant 59 year old white female who has a history of polycythemia. She presented with a hemoglobin level of 20.4 and a hematocrit level of 56.9 on August 21, 2023. She had a MARISA 2 mutation test by primary care physician which was reportedly negative. Her erythropoietin level was 18.9 which is elevated. Polycythemia vera was ruled out. She has been on q 2 month therapeutic phlebotomies at my office. Most recent phlebotomy 11/13/24.      I did a workup again which showed JAK2 mutation testing negative and the erythropoietin level of 26 on December 17, 2024.  I will discontinue phlebotomy in my office and I recommended that she donate blood 1 unit every 2 to 3 months at the blood bank. Continue aspirin 81mg daily\"      Current Treatment: aspirin 81mg daily, blood donation vs phlebotomy q2-3mo    Treatment History:   ***    Interval History Andrés LIND:   Bernarda returns to clinic today for follow up.       Allergies as of 06/30/2025    (No Known Allergies)     Current Medications[1]  Review of Systems:  ROS  Physical Exam:  There were no vitals filed for this visit.  Physical Exam    Labs: reviewed with pt today      Assessment & Plan:  1. Erythrocytosis [D75.1]  FERRITIN    Comp Metabolic Panel    CBC WITH DIFFERENTIAL      2. Polycythemia  FERRITIN    Comp Metabolic Panel    CBC WITH DIFFERENTIAL      3. Encounter for hematology follow-up            Polycythemia of unknown cause   Stability of condition: {SYMPTOM PROGRESSION:7}  -Continue to trend CBC, CMP, ferritin every 2 months    -continue therapeutic phlebotomy q3mo pending labs    Return for Follow Up: 6mo, will establish with Dr" Loy      Any questions and concerns raised by the patient were answered to the best of my ability. Thank you for allowing me to participate in the care for this patient. Please feel free to contact me for any questions or concerns.   Total time spent on chart review, clinic encounter, documentation, coordination of care: 30 minutes.   Please note that this dictation was created using voice recognition software. I have made every reasonable attempt to correct obvious errors, but I expect that there are errors of grammar and possibly content that I did not discover before finalizing the note.            [1]   Current Outpatient Medications:     lamoTRIgine (LAMICTAL) 25 MG Tab, Take 25 mg by mouth every day., Disp: , Rfl:     prazosin (MINIPRESS) 1 MG Cap, Take 1 mg by mouth at bedtime., Disp: , Rfl:     cyclobenzaprine (FLEXERIL) 10 mg Tab, TAKE ONE TABLET BY MOUTH THREE TIMES A DAY AS NEEDED FOR MUSCLE SPASMS, Disp: 30 Tablet, Rfl: 1    escitalopram (LEXAPRO) 20 MG tablet, Take 1 Tablet by mouth every day., Disp: 60 Tablet, Rfl: 1

## 2025-06-30 ENCOUNTER — APPOINTMENT (OUTPATIENT)
Dept: HEMATOLOGY ONCOLOGY | Facility: MEDICAL CENTER | Age: 60
End: 2025-06-30
Attending: PHYSICIAN ASSISTANT
Payer: COMMERCIAL

## 2025-06-30 DIAGNOSIS — Z09 ENCOUNTER FOR HEMATOLOGY FOLLOW-UP: ICD-10-CM

## 2025-06-30 DIAGNOSIS — D75.1 POLYCYTHEMIA: ICD-10-CM

## 2025-06-30 DIAGNOSIS — D75.1 ERYTHROCYTOSIS: Primary | ICD-10-CM
